# Patient Record
Sex: FEMALE | Race: WHITE | NOT HISPANIC OR LATINO | Employment: OTHER | URBAN - METROPOLITAN AREA
[De-identification: names, ages, dates, MRNs, and addresses within clinical notes are randomized per-mention and may not be internally consistent; named-entity substitution may affect disease eponyms.]

---

## 2018-01-18 NOTE — PROGRESS NOTES
Assessment    1  Aftercare following surgery of the musculoskeletal system (V58 78) (Z47 89)    Plan  Aftercare following surgery of the musculoskeletal system    · Follow-up visit in 6 months Evaluation and Treatment  Follow-up  Status: Complete   Done: [id number]  Aftercare following surgery of the musculoskeletal system, Closed fracture of neck of left  femur, sequela    · * XR HIP 2+ VIEW LEFT; Status:Resulted - Requires Verification;   Done: [id number]  02:35PM    Discussion/Summary    Patient was seen and examined by Dr Marco Guzman and myself  Patient is s/p left hip hemiarthroplasty  She is doing well  She is to continue WBAT to LLE and PT  Posterior hip precautions were stressed  Follow-up in 6 months with a new x-ray  Chief Complaint  post op left hip      Post-Op  HPI: This is a 80year old female who is s/p left hip hemiarthroplasty on December 5, 2015 by Dr Bishop Nguyen  She is improving with physical therapy  She is ambulating for longer distances with a walker  She states she has minimal pain  Review of Systems    Constitutional: No fever, no chills, feels well, no tiredness, no recent weight gain or loss  Eyes: No complaints of eyesight problems, no red eyes  ENT: no loss of hearing, no nosebleeds, no sore throat  Cardiovascular: No complaints of chest pain, no palpitations, no leg claudication or lower extremity edema  Respiratory: no compliants of shortness of breath, no wheezing, no cough  Gastrointestinal: no complaints of abdominal pain, no constipation, no nausea or diarrhea, no vomiting, no bloody stools  Genitourinary: no complaints of dysuria, no incontinence  Musculoskeletal: as noted in HPI  Integumentary: no complaints of skin rash or lesion, no itching or dry skin, no skin wounds  Neurological: no complaints of headache, no confusion, no numbness or tingling, no dizziness     Endocrine: No complaints of muscle weakness, no feelings of weakness, no frequent urination, no excessive thirst    Psychiatric: No suicidal thoughts, no anxiety, no feelings of depression  ROS reviewed  Active Problems    1  Aftercare following surgery of the musculoskeletal system (V58 78) (Z47 89)   2  Closed fracture of neck of left femur, initial encounter (820 8) (S72 002A)   3  Closed fracture of neck of left femur, sequela (820 8) (S72 002S)    Social History    · Never a smoker    Current Meds   1  Aspirin 325 MG Oral Tablet; TAKE 1 TABLET TWICE DAILY; Therapy: (Recorded:[id number]) to Recorded   2  OxyCODONE HCl - 5 MG Oral Tablet; TAKE 1 TABLET EVERY 4 HOURS AS NEEDED   FOR PAIN;   Therapy: (Recorded:[id number]) to Recorded    Allergies    1  No Known Drug Allergies    Vitals   Recorded: [id number] 03:00PM   Heart Rate 70   Systolic 037   Diastolic 76   Weight 797 lb      Physical Exam    Constitutional - General appearance: Normal    Musculoskeletal - Gait and station: Abnormal  Gait evaluation demonstrated Patient is in a stretcher today  Eyes   Conjunctiva and lids: Normal     Left Hip: Appearance: Incision healed  Surgical incision was clean, dry, and intact  Tenderness: None  Appropriate tenderness over incision  calf soft, nontender  ROM: limited ROM in all planes Flexion: painless  Internal rotation: which was painless  External rotation: which was painless  Motor: diffuse weakness  Deferred      Results/Data  * XR HIP 2+ VIEW LEFT [id number] 02:35PM Viraj CUENCA Order Number: [id number]   Performing Comments: DOS 12/5/15     Test Name Result Flag Reference   * XR HIP/PELV 2-3 VWS LEFT (Report)     LEFT HIP     INDICATION: Postoperative  Follow-up  COMPARISON: December 18, 2015     VIEWS: AP pelvis and 2 coned down views; 3 images     FINDINGS:     There is no fracture or dislocation  Left hip prosthesis in satisfactory position  Mild degenerative changes right hip  No lytic or blastic lesions are seen  Soft tissues are unremarkable         IMPRESSION:   Stable postoperative changes left hip  No acute osseous abnormality  Signed by:   Anshu Hendrix DO   1/29/16     I personally reviewed the films/images/results in the office today  My interpretation follows  X-ray Review 3 views of the left hip reveal an intact prosthesis with no evidence of dislocation or loosening        Signatures   Electronically signed by : Cynthia Duffy, [facility]; Jan 29 2016  3:52PM EST                       (Author)    Electronically signed by : Anneliese Mtz MD; Jan 29 2016  4:27PM EST                       (Author)

## 2018-07-17 ENCOUNTER — APPOINTMENT (INPATIENT)
Dept: RADIOLOGY | Facility: HOSPITAL | Age: 83
DRG: 041 | End: 2018-07-17
Payer: MEDICARE

## 2018-07-17 ENCOUNTER — APPOINTMENT (EMERGENCY)
Dept: RADIOLOGY | Facility: HOSPITAL | Age: 83
DRG: 041 | End: 2018-07-17
Payer: MEDICARE

## 2018-07-17 ENCOUNTER — HOSPITAL ENCOUNTER (INPATIENT)
Facility: HOSPITAL | Age: 83
LOS: 5 days | Discharge: NURSING, REHAB OR LONG-TERM CARE | DRG: 041 | End: 2018-07-22
Attending: EMERGENCY MEDICINE | Admitting: INTERNAL MEDICINE
Payer: MEDICARE

## 2018-07-17 DIAGNOSIS — Z92.82 RECEIVED INTRAVENOUS TISSUE PLASMINOGEN ACTIVATOR (T-PA) IN EMERGENCY DEPARTMENT: ICD-10-CM

## 2018-07-17 DIAGNOSIS — S82.832A OTHER CLOSED FRACTURE OF DISTAL END OF LEFT FIBULA, INITIAL ENCOUNTER: ICD-10-CM

## 2018-07-17 DIAGNOSIS — R27.0 ATAXIA OF LEFT UPPER EXTREMITY: ICD-10-CM

## 2018-07-17 DIAGNOSIS — R29.898 LEFT ARM WEAKNESS: Primary | ICD-10-CM

## 2018-07-17 DIAGNOSIS — I63.9 ACUTE CVA (CEREBROVASCULAR ACCIDENT) (HCC): ICD-10-CM

## 2018-07-17 DIAGNOSIS — B36.9 FUNGAL RASH OF TORSO: ICD-10-CM
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
## 2018-07-17 PROBLEM — R58 ECCHYMOSIS: Status: ACTIVE | Noted: 2018-07-17

## 2018-07-17 LAB
ABO GROUP BLD: NORMAL
ALBUMIN SERPL BCP-MCNC: 3.2 G/DL (ref 3.5–5)
ALP SERPL-CCNC: 68 U/L (ref 46–116)
ALT SERPL W P-5'-P-CCNC: 22 U/L (ref 12–78)
ANION GAP SERPL CALCULATED.3IONS-SCNC: 5 MMOL/L (ref 4–13)
APTT PPP: 32 SECONDS (ref 24–36)
AST SERPL W P-5'-P-CCNC: 27 U/L (ref 5–45)
BILIRUB DIRECT SERPL-MCNC: 0.07 MG/DL (ref 0–0.2)
BILIRUB SERPL-MCNC: 0.45 MG/DL (ref 0.2–1)
BLD GP AB SCN SERPL QL: NEGATIVE
BUN BLD-MCNC: 24 MG/DL (ref 5–25)
BUN SERPL-MCNC: 21 MG/DL (ref 5–25)
CA-I BLD-SCNC: 1.23 MMOL/L (ref 1.12–1.32)
CALCIUM SERPL-MCNC: 8.9 MG/DL (ref 8.3–10.1)
CHLORIDE BLD-SCNC: 105 MMOL/L (ref 100–108)
CHLORIDE SERPL-SCNC: 109 MMOL/L (ref 100–108)
CO2 SERPL-SCNC: 30 MMOL/L (ref 21–32)
CREAT BLD-MCNC: 0.7 MG/DL (ref 0.6–1.3)
CREAT SERPL-MCNC: 0.75 MG/DL (ref 0.6–1.3)
ERYTHROCYTE [DISTWIDTH] IN BLOOD BY AUTOMATED COUNT: 11.9 % (ref 11.6–15.1)
GFR SERPL CREATININE-BSD FRML MDRD: 73 ML/MIN/1.73SQ M
GFR SERPL CREATININE-BSD FRML MDRD: 80 ML/MIN/1.73SQ M
GLUCOSE SERPL-MCNC: 149 MG/DL (ref 65–140)
GLUCOSE SERPL-MCNC: 152 MG/DL (ref 65–140)
HCT VFR BLD AUTO: 44.2 % (ref 34.8–46.1)
HCT VFR BLD CALC: 42 % (ref 34.8–46.1)
HGB BLD-MCNC: 14.1 G/DL (ref 11.5–15.4)
HGB BLDA-MCNC: 14.3 G/DL (ref 11.5–15.4)
INR PPP: 1 (ref 0.86–1.17)
MCH RBC QN AUTO: 30.5 PG (ref 26.8–34.3)
MCHC RBC AUTO-ENTMCNC: 31.9 G/DL (ref 31.4–37.4)
MCV RBC AUTO: 96 FL (ref 82–98)
PLATELET # BLD AUTO: 210 THOUSANDS/UL (ref 149–390)
PMV BLD AUTO: 9.2 FL (ref 8.9–12.7)
POTASSIUM BLD-SCNC: 3.8 MMOL/L (ref 3.5–5.3)
POTASSIUM SERPL-SCNC: 4.1 MMOL/L (ref 3.5–5.3)
PROT SERPL-MCNC: 6.9 G/DL (ref 6.4–8.2)
PROTHROMBIN TIME: 13.3 SECONDS (ref 11.8–14.2)
RBC # BLD AUTO: 4.63 MILLION/UL (ref 3.81–5.12)
RH BLD: POSITIVE
SODIUM BLD-SCNC: 145 MMOL/L (ref 136–145)
SODIUM SERPL-SCNC: 144 MMOL/L (ref 136–145)
SPECIMEN EXPIRATION DATE: NORMAL
SPECIMEN SOURCE: ABNORMAL
TROPONIN I SERPL-MCNC: 0.02 NG/ML
TROPONIN I SERPL-MCNC: <0.02 NG/ML
WBC # BLD AUTO: 5.86 THOUSAND/UL (ref 4.31–10.16)

## 2018-07-17 PROCEDURE — 85610 PROTHROMBIN TIME: CPT | Performed by: EMERGENCY MEDICINE

## 2018-07-17 PROCEDURE — 93005 ELECTROCARDIOGRAM TRACING: CPT

## 2018-07-17 PROCEDURE — 70496 CT ANGIOGRAPHY HEAD: CPT

## 2018-07-17 PROCEDURE — 86850 RBC ANTIBODY SCREEN: CPT | Performed by: EMERGENCY MEDICINE

## 2018-07-17 PROCEDURE — 36415 COLL VENOUS BLD VENIPUNCTURE: CPT

## 2018-07-17 PROCEDURE — 85014 HEMATOCRIT: CPT

## 2018-07-17 PROCEDURE — 80048 BASIC METABOLIC PNL TOTAL CA: CPT | Performed by: EMERGENCY MEDICINE

## 2018-07-17 PROCEDURE — 85730 THROMBOPLASTIN TIME PARTIAL: CPT | Performed by: EMERGENCY MEDICINE

## 2018-07-17 PROCEDURE — 80047 BASIC METABLC PNL IONIZED CA: CPT

## 2018-07-17 PROCEDURE — 84484 ASSAY OF TROPONIN QUANT: CPT | Performed by: EMERGENCY MEDICINE

## 2018-07-17 PROCEDURE — 3E03317 INTRODUCTION OF OTHER THROMBOLYTIC INTO PERIPHERAL VEIN, PERCUTANEOUS APPROACH: ICD-10-PCS | Performed by: EMERGENCY MEDICINE

## 2018-07-17 PROCEDURE — 73610 X-RAY EXAM OF ANKLE: CPT

## 2018-07-17 PROCEDURE — 85027 COMPLETE CBC AUTOMATED: CPT | Performed by: EMERGENCY MEDICINE

## 2018-07-17 PROCEDURE — 99291 CRITICAL CARE FIRST HOUR: CPT | Performed by: EMERGENCY MEDICINE

## 2018-07-17 PROCEDURE — 96374 THER/PROPH/DIAG INJ IV PUSH: CPT

## 2018-07-17 PROCEDURE — 73110 X-RAY EXAM OF WRIST: CPT

## 2018-07-17 PROCEDURE — 71045 X-RAY EXAM CHEST 1 VIEW: CPT

## 2018-07-17 PROCEDURE — 80076 HEPATIC FUNCTION PANEL: CPT | Performed by: STUDENT IN AN ORGANIZED HEALTH CARE EDUCATION/TRAINING PROGRAM

## 2018-07-17 PROCEDURE — 84484 ASSAY OF TROPONIN QUANT: CPT | Performed by: STUDENT IN AN ORGANIZED HEALTH CARE EDUCATION/TRAINING PROGRAM

## 2018-07-17 PROCEDURE — 99285 EMERGENCY DEPT VISIT HI MDM: CPT

## 2018-07-17 PROCEDURE — 73140 X-RAY EXAM OF FINGER(S): CPT

## 2018-07-17 PROCEDURE — 70450 CT HEAD/BRAIN W/O DYE: CPT

## 2018-07-17 PROCEDURE — 86901 BLOOD TYPING SEROLOGIC RH(D): CPT | Performed by: EMERGENCY MEDICINE

## 2018-07-17 PROCEDURE — 73590 X-RAY EXAM OF LOWER LEG: CPT

## 2018-07-17 PROCEDURE — 99223 1ST HOSP IP/OBS HIGH 75: CPT | Performed by: PSYCHIATRY & NEUROLOGY

## 2018-07-17 PROCEDURE — 70498 CT ANGIOGRAPHY NECK: CPT

## 2018-07-17 PROCEDURE — 86900 BLOOD TYPING SEROLOGIC ABO: CPT | Performed by: EMERGENCY MEDICINE

## 2018-07-17 RX ORDER — ACETAMINOPHEN 500 MG
1000 TABLET ORAL EVERY 6 HOURS PRN
COMMUNITY

## 2018-07-17 RX ORDER — LABETALOL HYDROCHLORIDE 5 MG/ML
10 INJECTION, SOLUTION INTRAVENOUS ONCE
Status: COMPLETED | OUTPATIENT
Start: 2018-07-17 | End: 2018-07-17

## 2018-07-17 RX ORDER — ATORVASTATIN CALCIUM 40 MG/1
40 TABLET, FILM COATED ORAL EVERY EVENING
Status: DISCONTINUED | OUTPATIENT
Start: 2018-07-17 | End: 2018-07-22 | Stop reason: HOSPADM

## 2018-07-17 RX ORDER — SODIUM CHLORIDE 9 MG/ML
75 INJECTION, SOLUTION INTRAVENOUS CONTINUOUS
Status: DISCONTINUED | OUTPATIENT
Start: 2018-07-17 | End: 2018-07-18

## 2018-07-17 RX ADMIN — IODIXANOL 100 ML: 320 INJECTION, SOLUTION INTRAVASCULAR at 16:45

## 2018-07-17 RX ADMIN — ALTEPLASE 41.7 MG: KIT at 17:20

## 2018-07-17 RX ADMIN — LABETALOL HYDROCHLORIDE 10 MG: 5 INJECTION, SOLUTION INTRAVENOUS at 17:32

## 2018-07-17 RX ADMIN — ATORVASTATIN CALCIUM 40 MG: 40 TABLET, FILM COATED ORAL at 22:21

## 2018-07-17 RX ADMIN — SODIUM CHLORIDE 75 ML/HR: 0.9 INJECTION, SOLUTION INTRAVENOUS at 20:28

## 2018-07-17 NOTE — H&P
History and Physical - Critical Care  Mello Martinez 80 y o  female MRN: [id number]  Unit/Bed#: ED 29 Encounter: [id number]     Reason for Admission / Chief Complaint:  Pre-hospital stroke alert given tPA in the emergency room     History of Present Illness: Mello Martinez is a 80 y o  female with no past medical history but does not regularly see a doctor who presents to [facility] Emergency room this evening after multiple falls at home  Patient initially fell at home and had EMS come to her house for lift assist   At that time EMS noted no abnormalities, she did not hit her head, and she is not on any blood thinning medications  EMS left the patient at her residence at approximately , which was her last known normal   Approximately 20 minutes later the patient's son showed up and noted that she again was on the floor but this time had left-sided weakness with slurred speech and altered mental status  911 was called and the patient was brought to the emergency department as a pre-hospital stroke alert  Initial CT and CTA of the patient's head and neck were unremarkable  She had an NIHS score of five and was given tPA at approximately 1720  The points she received on the NIHS score were mainly for her left upper extremity ataxia rather than weakness  Patient did have some pain in her left lower extremity, which an x-ray showed a fractured left fibula  The patient had a splint placed in the emergency department  Patient currently not complaining of any pain elsewhere and she denies any headaches, change of vision, numbness, tingling, or weakness  History obtained from chart review and the patient  Past Medical History:  No past medical history on file  Past Surgical History:  No past surgical history on file  Past Family History:  No family history on file       Social History:  History   Smoking Status    Never Smoker   Smokeless Tobacco    Not on file     History   Alcohol Use No     History   Drug Use No     Marital Status: Single     Medications:  Current Facility-Administered Medications   Medication Dose Route Frequency    atorvastatin (LIPITOR) tablet 40 mg  40 mg Oral QPM    sodium chloride 0 9 % infusion  75 mL/hr Intravenous Continuous     Home medications:  Prior to Admission medications    Not on File     Allergies:  No Known Allergies     ROS:   Review of Systems   Constitutional: Negative for chills, fatigue and fever  HENT: Negative for congestion, rhinorrhea, sinus pressure and sore throat  Eyes: Negative for visual disturbance  Respiratory: Negative for cough and shortness of breath  Cardiovascular: Negative for chest pain  Gastrointestinal: Negative for abdominal pain, constipation, diarrhea, nausea and vomiting  Genitourinary: Negative for dysuria, frequency, hematuria and urgency  Musculoskeletal: Negative for arthralgias and myalgias  Skin: Negative for color change and rash  Neurological: Negative for dizziness, light-headedness and numbness  Vitals:  Vitals:    18 1835 18 1850 18 1905 18 1935   BP: (!) 177/82 169/81 154/75 151/75   BP Location:    Left arm   Pulse: 62 66 68 70   Resp:  18   Temp:       TempSrc:       SpO2:       Weight:         Temperature:   Temp (24hrs), Av 6 °F (37 °C), Min:97 8 °F (36 6 °C), Max:99 3 °F (37 4 °C)    Current: Temperature: 97 8 °F (36 6 °C)     Weights:   IBW: -92 5 kg  There is no height or weight on file to calculate BMI  Hemodynamic Monitoring:  N/A     Non-Invasive/Invasive Ventilation Settings:  Respiratory    Lab Data (Last 4 hours)    None         O2/Vent Data (Last 4 hours)    None              No results found for: PHART, YSG9VWZ, PO2ART, CSR6TPZ, A0RUJCIS, BEART, SOURCE  SpO2: SpO2: 97 %     Physical Exam:  Physical Exam   Constitutional: She is oriented to person, place, and time  She appears well-developed and well-nourished  No distress     HENT: Head: Normocephalic and atraumatic  Right Ear: External ear normal    Left Ear: External ear normal    Nose: Nose normal    Eyes: Conjunctivae and EOM are normal  Pupils are equal, round, and reactive to light  Right eye exhibits no discharge  Left eye exhibits no discharge  No scleral icterus  Neck: Normal range of motion  Neck supple  No JVD present  No tracheal deviation present  No thyromegaly present  Cardiovascular: Normal rate, regular rhythm, normal heart sounds and intact distal pulses  Exam reveals no gallop and no friction rub  No murmur heard  Pulmonary/Chest: Effort normal  No stridor  No respiratory distress  She has no wheezes  She has no rales  Abdominal: Soft  Bowel sounds are normal  She exhibits no distension  There is no tenderness  There is no rebound  Musculoskeletal: She exhibits edema (Swelling of the left wrist and left lower extremity surrounding the fracture)  She exhibits no tenderness or deformity  Lymphadenopathy:     She has no cervical adenopathy  Neurological: She is alert and oriented to person, place, and time  No cranial nerve deficit  She exhibits normal muscle tone  Coordination normal    Skin: Skin is warm and dry  No rash noted  She is not diaphoretic  No erythema  Multiple ecchymoses covering patient's bilateral upper extremities  See pictures below  Nursing note and vitals reviewed                           Labs:    Results from last 7 days  Lab Units 07/17/18  1644 07/17/18  1641   WBC Thousand/uL 5 86  --    HEMOGLOBIN g/dL 14 1  --    I STAT HEMOGLOBIN g/dl  --  14 3   HEMATOCRIT % 44 2  --    PLATELETS Thousands/uL 210  --       Results from last 7 days  Lab Units 07/17/18  1644 07/17/18  1641   SODIUM mmol/L 144  --    POTASSIUM mmol/L 4 1  --    CHLORIDE mmol/L 109*  --    CO2 mmol/L 30  --    BUN mg/dL 21  --    CREATININE mg/dL 0 75  --    CALCIUM mg/dL 8 9  --    GLUCOSE RANDOM mg/dL 149*  --    GLUCOSE, ISTAT mg/dl  --  152* Results from last 7 days  Lab Units 07/17/18  1644   INR  1 00   PTT seconds 32           0  Lab Value Date/Time   TROPONINI <0 02 07/17/2018 1644        Imaging: CTH - No acute intracranial abnormality  Mild chronic small vessel ischemic changes and volume loss  CTA - No acute intracranial abnormality      No focal stenosis or saccular aneurysm within the [place]      No hemodynamically significant stenosis within either common or internal carotid artery  Less than 50% stenosis by NASCET criteria      Unremarkable bilateral vertebral arteries  Tib/fib xray- Nondisplaced fracture of the distal fibula     I have personally reviewed pertinent films in PACS    Micro:  No results found for: Irene Storey, SPUTUM[employer]UR    Assessment: 81 yo female with acute onset LUE ataxia given tPA for suspected stroke currently doing well  Plan:                  Neuro: suspected stroke - Patient currently without any neurologic abnormalities on exam  Will admit her under the stroke pathway and get a repeat CTH 24h post tPA to assess for any bleed, which will occur at approximately 1700 tomorrow  Will also evaluate patient via MRI to further clarify whether patient suffered a CVA or not  CV: Patient with no medical history but will allow for permissive HTN up to 160 SBP  Follow up lipid panel  Lung: No active issues at this time  Patient currently satting well on room air  GI: No active issues at this time  FEN: Maintenance IVF at 75ml/hr NS currently  Monitor electrolytes daily with goal K>4, P>3, and Mg>2  Replete as necessary  Patient currently npo  : No active issues at this time  Follow up urinalysis  ID: No active issues at this time  Heme: No active issues  Endo: No active issues  f/u hemoglobin A1c                   Msk/Skin: Patient with splint in place for her distal fibula fracture  Continue to monitor for signs and symptoms of compartment syndrome especially in the setting of tPA administration  Patient with multiple ecchymoses covering her upper extremities  Borders of the ecchymoses marked and pictures taken (see physical exam)  Disposition: Continue ICU care     VTE Pharmacologic Prophylaxis: Reason for no pharmacologic prophylaxis tPA administration  VTE Mechanical Prophylaxis: sequential compression device     Invasive lines and devices: Invasive Devices     Peripheral Intravenous Line            Peripheral IV 07/17/18 Left Antecubital less than 1 day    Peripheral IV 07/17/18 Right Antecubital less than 1 day                 Code Status: Level 1 - Full Code  POA:    POLST:       Given critical illness, patient length of stay will require greater than two midnights  Counseling / Coordination of Care  Total Critical Care time spent 30 minutes excluding procedures, teaching and family updates  Portions of the record may have been created with voice recognition software  Occasional wrong word or "sound a like" substitutions may have occurred due to the inherent limitations of voice recognition software  Read the chart carefully and recognize, using context, where substitutions have occurred          Deborah Caballero MD

## 2018-07-17 NOTE — ED PROVIDER NOTES
History  Chief Complaint   Patient presents with    STROKE Alert     Pt fell twice today  After second time falling pt was seen altered and leaning on her L side  Patient is an 80year-old female with a history of L hip replacements, not on any current medications, who presents as a pre-hospital stroke alert with left-sided weakness  Per EMS report, they were called to the home at 3:30 for a lift assist after the patient fell  She did not have any neurologic deficits per their exam at that time  Less than 20 minutes later, the son arrived to her home found her on the ground  She had left-sided weakness at that time  EMS also noted some mild slurred speech and waxing-and-waning confusion  Pre-hospital glucose was normal  On my initial evaluation, patient reports left-sided weakness and left ankle pain since the fall  She denies headache, blurry/double vision, facial droop, chest pain, shortness of breath, abdominal pain, nausea/vomiting, diarrhea, fever/chills  Prior to Admission Medications   Prescriptions Last Dose Informant Patient Reported? Taking?   acetaminophen (TYLENOL) 500 mg tablet  Self Yes Yes   Sig: Take 1,000 mg by mouth every 6 (six) hours as needed for mild pain      Facility-Administered Medications: None       Past Medical History:   Diagnosis Date    Arthritis     scoliosis       Past Surgical History:   Procedure Laterality Date    ABDOMINAL SURGERY      bowel surgery approx  2003    JOINT REPLACEMENT Left 2015    hip       History reviewed  No pertinent family history  I have reviewed and agree with the history as documented  Social History   Substance Use Topics    Smoking status: Never Smoker    Smokeless tobacco: Never Used    Alcohol use No        Review of Systems   Constitutional: Negative for chills, fatigue and fever  HENT: Negative for congestion and sore throat  Eyes: Negative for visual disturbance     Respiratory: Negative for cough and shortness of breath  Cardiovascular: Negative for chest pain  Gastrointestinal: Negative for abdominal pain, diarrhea, nausea and vomiting  Endocrine: Negative for polyuria  Genitourinary: Negative for difficulty urinating and dysuria  Musculoskeletal: Positive for joint swelling  Skin: Negative for rash and wound  Neurological: Positive for weakness  Negative for dizziness, light-headedness and headaches  Psychiatric/Behavioral: Negative for confusion  All other systems reviewed and are negative  Physical Exam  ED Triage Vitals   Temperature Pulse Respirations Blood Pressure SpO2   07/17/18 1637 07/17/18 1636 07/17/18 1636 07/17/18 1636 07/17/18 1636   99 3 °F (37 4 °C) (!) 107 18 107/73 97 %      Temp Source Heart Rate Source Patient Position - Orthostatic VS BP Location FiO2 (%)   07/17/18 1637 07/17/18 1826 07/17/18 1826 07/17/18 1826 --   Tympanic Monitor Lying Left arm       Pain Score       07/17/18 2000       3           Orthostatic Vital Signs  Vitals:    07/17/18 2100 07/17/18 2150 07/17/18 2200 07/17/18 2300   BP: 129/65  141/72 140/69   Pulse: 72  68 70   Patient Position - Orthostatic VS:  Lying Lying Lying       Physical Exam   Constitutional: She is oriented to person, place, and time  She appears well-developed and well-nourished  No distress  HENT:   Head: Normocephalic and atraumatic  Eyes: EOM are normal  Pupils are equal, round, and reactive to light  Neck: Normal range of motion  Neck supple  Cardiovascular: Normal rate, regular rhythm and normal heart sounds  Pulmonary/Chest: Effort normal and breath sounds normal  No respiratory distress  Abdominal: Soft  Bowel sounds are normal  There is no tenderness  Musculoskeletal: Normal range of motion  Neurological: She is alert and oriented to person, place, and time  Dysarthria speech  CN otherwise intact  Severe ataxia left side, upper greater than lower  5 out of 5 strength bilateral upper and lower extremities  Skin: Skin is warm and dry  Psychiatric: She has a normal mood and affect  Nursing note and vitals reviewed        ED Medications  Medications   sodium chloride 0 9 % infusion (75 mL/hr Intravenous New Bag 7/17/18 2028)   atorvastatin (LIPITOR) tablet 40 mg (40 mg Oral Given 7/17/18 2221)   iodixanol (VISIPAQUE) 320 MG/ML injection 100 mL (100 mL Intravenous Given 7/17/18 1645)   alteplase (ACTIVASE) bolus 4 6 mg (4 6 mg Intravenous Given 7/17/18 1719)   alteplase (ACTIVASE) infusion 41 7 mg (41 7 mg Intravenous Given 7/17/18 1720)   labetalol (NORMODYNE) injection 10 mg (10 mg Intravenous Given 7/17/18 1732)       Diagnostic Studies  Results Reviewed     Procedure Component Value Units Date/Time    Troponin I [[id number]]  (Normal) Collected:  07/17/18 2247    Lab Status:  Final result Specimen:  Blood from Arm, Left Updated:  07/17/18 2315     Troponin I 0 02 ng/mL     Hepatic function panel [[id number]]  (Abnormal) Collected:  07/17/18 1644    Lab Status:  Final result Specimen:  Blood from Arm, Right Updated:  07/17/18 2017     Total Bilirubin 0 45 mg/dL      Bilirubin, Direct 0 07 mg/dL      Alkaline Phosphatase 68 U/L      AST 27 U/L      ALT 22 U/L      Total Protein 6 9 g/dL      Albumin 3 2 (L) g/dL     Urinalysis with reflex to microscopic [[id number]]     Lab Status:  No result Specimen:  Urine     APTT [[id number]]  (Normal) Collected:  07/17/18 1644    Lab Status:  Final result Specimen:  Blood from Arm, Right Updated:  07/17/18 1731     PTT 32 seconds     Protime-INR [[id number]]  (Normal) Collected:  07/17/18 1644    Lab Status:  Final result Specimen:  Blood from Arm, Right Updated:  07/17/18 1731     Protime 13 3 seconds      INR 1 00    Troponin I [[id number]]  (Normal) Collected:  07/17/18 1644    Lab Status:  Final result Specimen:  Blood from Arm, Right Updated:  07/17/18 1729     Troponin I <0 02 ng/mL     Basic metabolic panel [[id number]]  (Abnormal) Collected:  07/17/18 1644    Lab Status:  Final result Specimen:  Blood from Arm, Right Updated:  07/17/18 1725     Sodium 144 mmol/L      Potassium 4 1 mmol/L      Chloride 109 (H) mmol/L      CO2 30 mmol/L      Anion Gap 5 mmol/L      BUN 21 mg/dL      Creatinine 0 75 mg/dL      Glucose 149 (H) mg/dL      Calcium 8 9 mg/dL      eGFR 73 ml/min/1 73sq m     Narrative:         National Kidney Disease Education Program recommendations are as follows:  GFR calculation is accurate only with a steady state creatinine  Chronic Kidney disease less than 60 ml/min/1 73 sq  meters  Kidney failure less than 15 ml/min/1 73 sq  meters  CBC [[id number]]  (Normal) Collected:  07/17/18 1644    Lab Status:  Final result Specimen:  Blood from Arm, Right Updated:  07/17/18 1702     WBC 5 86 Thousand/uL      RBC 4 63 Million/uL      Hemoglobin 14 1 g/dL      Hematocrit 44 2 %      MCV 96 fL      MCH 30 5 pg      MCHC 31 9 g/dL      RDW 11 9 %      Platelets 546 Thousands/uL      MPV 9 2 fL     POCT Chem 8+ [[id number]]  (Abnormal) Collected:  07/17/18 1641    Lab Status:  Final result Updated:  07/17/18 1645     SODIUM, I-STAT 145 mmol/l      Potassium, i-STAT 3 8 mmol/L      Chloride, istat 105 mmol/L      CO2, i-STAT -- mmol/L      Anion Gap, Istat -- mmol/L      Calcium, Ionized i-STAT 1 23 mmol/L      BUN, I-STAT 24 mg/dl      Creatinine, i-STAT 0 7 mg/dl      eGFR 80 ml/min/1 73sq m      Glucose, i-STAT 152 (H) mg/dl      Hct, i-STAT 42 %      Hgb, i-STAT 14 3 g/dl      Specimen Type VENOUS                 XR wrist 3+ views LEFT   Final Result by Cuong Booth MD (07/17 2054)      No acute osseous abnormality  Workstation performed: [id number]         XR finger second digit-index RIGHT   Final Result by Cuong Booth MD (07/17 2050)      No fracture  Workstation performed: [id number]         XR ankle 3+ views LEFT   Final Result by Cuong Booth MD (07/17 1756)      Nondisplaced fracture of the distal fibula        The study was marked in EPIC for significant notification  Workstation performed: [id number]         XR stroke alert portable chest   Final Result by Christie Arguello MD (07/17 1714)      No acute cardiopulmonary disease  Workstation performed: [id number]         CTA stroke alert (head/neck)   Final Result by Noble Singh MD (07/17 1708)      No acute intracranial abnormality  No focal stenosis or saccular aneurysm within the [place]  No hemodynamically significant stenosis within either common or internal carotid artery  Less than 50% stenosis by NASCET criteria  Unremarkable bilateral vertebral arteries  Workstation performed: [id number]         CT stroke alert brain   Final Result by Noble Singh MD (07/17 1654)      No acute intracranial abnormality  Mild chronic small vessel ischemic changes and volume loss  Findings were directly discussed with Merary Pride on 7/17/2018 4:45 PM       Workstation performed: [id number]         MRI Inpatient Order    (Results Pending)   XR tibia fibula 2 vw left    (Results Pending)         Procedures  Procedures      Phone Consults  ED Phone Contact    ED Course                               MDM  Number of Diagnoses or Management Options  Ataxia of left upper extremity:   Left arm weakness:   Received intravenous tissue plasminogen activator (t-PA) in emergency department:   Diagnosis management comments: Patient is an 80year-old female with a history of L hip replacements, not on any current medications, who presents as a pre-hospital stroke alert  Found to have left upper>lower ataxia and slurred/dysarthric speech  Initial NIH stroke scale 5  Last known normal 3:30 per EMS, who evaluated her at home following a fall  tPA administered  X-ray left ankle shows nondisplaced fracture of left fibula  Will splint  Patient admitted to the ICU      CritCare Time    Disposition  Final diagnoses:   Left arm weakness Received intravenous tissue plasminogen activator (t-PA) in emergency department   Ataxia of left upper extremity     Time reflects when diagnosis was documented in both MDM as applicable and the Disposition within this note     Time User Action Codes Description Comment    7/17/2018  4:39 PM Kirsten Amber Add [R29 [id number]] Left arm weakness     7/17/2018  7:21 PM Fahad Stover [Z92 [id number]] Received intravenous tissue plasminogen activator (t-PA) in emergency department     7/17/2018  7:21 PM Ilia Huber Add [R27 0] Ataxia of left upper extremity       ED Disposition     ED Disposition Condition Comment    Admit  Case was discussed with Critical care and the patient's admission status was agreed to be Admission Status: inpatient status to the service of Dr Lyn Wiggins   Follow-up Information    None         Current Discharge Medication List      CONTINUE these medications which have NOT CHANGED    Details   acetaminophen (TYLENOL) 500 mg tablet Take 1,000 mg by mouth every 6 (six) hours as needed for mild pain           No discharge procedures on file  ED Provider  Attending physically available and evaluated Euna Lombard I managed the patient along with the ED Attending      Electronically Signed by         Nancy Manning MD  07/17/18 3133

## 2018-07-17 NOTE — CONSULTS
Consultation - Stroke   Meg Contreras 80 y o  female MRN: [id number]  Unit/Bed#: ED 29 Encounter: [id number]      Assessment/Plan   Assessment: Meg Contreras is a 80 y o  female who is hard of hearing with no significant PMH, medications or allergies who presented to the ED with L sided weakness, ataxia and dysarthria with last known well at 1530  NIHSS 5   CT head and CTA were negative for acute abnormality or large vessel occlusion/flow limiting stenosis  TPA Decision: TPA given this admission  After a discussion of risks and benefits reviewing inclusion and exclusion criteria the decision was made to proceed with thrombolytic therapy  Verbal consent was obtained from  the patient  Plan:   L sided weakness, ataxia and dysarthria   Acute ischemic stroke protocol  tPA given  No CTA target lesion identified; therefore patient not a candidate for endovascular intervention  Hold AC/AP for now  Await repeat CTH x 24hrs post tPA as per protocol  Order Atorvastatin 40mg  CT head negative for acute abnormality  CTA of head and neck negative for large vessel occlusion  flow limiting stenosis  Order MRI brain without contrast   Order Echo   Telemetry  Order Lipid panel, HbA1C, TSH, UA, UDS   Secondary risk factor modification  Permissive hypertension with BP <180/105   Hyperglycemia control   PT/OT/ST  Stroke Education  Frequent neurological checks  Stat CT head with acute decline of in exam/mental status  Notify neurology with any changes in examination  History of Present Illness     Reason for Consult / Principal Problem: Stroke alert  Hx and PE limited by: dysarthria  Patient last known well: [address]  Stroke alert called: 1630  Neurology time of arrival: 1632  HPI: Meg Contreras is a 80 y o  female who is hard of hearing with no significant PMH, medications or allergies who presented to the ED with stroke like symptoms  According to EMS, the patient fell at approximately [address]   She required a police assist to stand up but apparently at that time she had no notable deficits  Approximately 20 minutes prior to arrival, the patient's son came to her home and found her on the floor again  EMS was called who noted L sided weakness, dysarthria and occasional alteration in MS  Per EMS vital signs were stable with /60 HR 72 NSR  CT head and CTA were negative for acute abnormality or large vessel occlusion or flow limiting stenosis  Inpatient consult to Neurology  Consult performed by: Miesha Renteria ordered by: Brian Bernard        Review of Systems Unable to evaluate due to urgency of situation, though patient does have L ankle pain  Historical Information   No past medical history on file  No past surgical history on file  Social History   History   Alcohol use Not on file     History   Drug use: Unknown     History   Smoking Status    Not on file   Smokeless Tobacco    Not on file     Family History: No family history on file  Review of previous medical records was completed  Meds/Allergies   all current active meds have been reviewed    Allergies not on file    Objective   Vitals:Blood pressure 107/73, pulse (!) 107, temperature 99 3 °F (37 4 °C), temperature source Tympanic, resp  rate 18, SpO2 97 %  ,There is no height or weight on file to calculate BMI  No intake or output data in the 24 hours ending 07/17/18 1644    Invasive Devices: Invasive Devices     Peripheral Intravenous Line            Peripheral IV 07/17/18 Left Antecubital less than 1 day    Peripheral IV 07/17/18 Right Antecubital less than 1 day                Physical Exam  Neurologic Exam    NIHSS:  1a Level of Consciousness: 0 = Alert   1b  LOC Questions: 0 = Answers both correctly   1c  LOC Commands: 0 = Obeys both correctly   2  Best Gaze: 0 = Normal   3  Visual: 0 = No visual field loss   4  Facial Palsy: 0=Normal symmetric movement   5a   Motor Right Arm: 0=No drift, limb holds 90 (or 45) degrees for full 10 seconds   5b  Motor Left Arm: 2=Some effort against gravity, limb cannot get to or maintain (if cured) 90 (or 45) degrees, drifts down to bed, but has some effort against gravity   6a  Motor Right Le=No drift, limb holds 90 (or 45) degrees for full 10 seconds   6b  Motor Left Le=No drift, limb holds 90 (or 45) degrees for full 10 seconds   7  Limb Ataxia:  2=Present in two limbs   8  Sensory: 0=Normal; no sensory loss   9  Best Language:  0=No aphasia, normal   10  Dysarthria: 1=Mild to moderate, patient slurs at least some words and at worst, can be understood with some difficulty   11  Extinction and Inattention (formerly Neglect): 0=No abnormality   Total Score: 5     Time NIHSS was completed: 5    Lab Results: No results found for this or any previous visit (from the past 24 hour(s))     Imaging Studies: I have personally reviewed pertinent films in PACS

## 2018-07-17 NOTE — PROGRESS NOTES
07/17/18 1700   Spiritual Beliefs/Perceptions   Support Systems Children   Plan of Care   Comments Pt  came in to hospital as stroke alert  Provided pastoral care to son     Assessment Completed by: Unit visit

## 2018-07-17 NOTE — ED ATTENDING ATTESTATION
Carmelina Daniel DO, saw and evaluated the patient  I have discussed the patient with the resident/non-physician practitioner and agree with the resident's/non-physician practitioner's findings, Plan of Care, and MDM as documented in the resident's/non-physician practitioner's note, except where noted  All available labs and Radiology studies were reviewed  At this point I agree with the current assessment done in the Emergency Department  I have conducted an independent evaluation of this patient a history and physical is as follows:    79 yo female BIBA as prehospital stroke alert for acute onset L sided weakness, confusion  Started at 1530h after she fell  20 minutes later she fell again and pts son noted L sided weakness, confusion and mild slurred speech  Symptoms continue to wax and wane  Evaluated pt in CT scan, had LUE ataxia, LLE ataxia, continued mild slurred speech  Pt follow commands  No other c/o at this time  Imp: CVA plan: stroke alert  Will determine if pt requires tPA  Will require admission for further eval and tx        Critical Care Time  CritCare Time    Procedures

## 2018-07-18 ENCOUNTER — APPOINTMENT (INPATIENT)
Dept: RADIOLOGY | Facility: HOSPITAL | Age: 83
DRG: 041 | End: 2018-07-18
Payer: MEDICARE

## 2018-07-18 ENCOUNTER — APPOINTMENT (INPATIENT)
Dept: NON INVASIVE DIAGNOSTICS | Facility: HOSPITAL | Age: 83
DRG: 041 | End: 2018-07-18
Payer: MEDICARE

## 2018-07-18 LAB
ATRIAL RATE: 66 BPM
BACTERIA UR QL AUTO: ABNORMAL /HPF
BILIRUB UR QL STRIP: NEGATIVE
CHOLEST SERPL-MCNC: 133 MG/DL (ref 50–200)
CLARITY UR: CLEAR
COLOR UR: YELLOW
EST. AVERAGE GLUCOSE BLD GHB EST-MCNC: 117 MG/DL
GLUCOSE UR STRIP-MCNC: NEGATIVE MG/DL
HBA1C MFR BLD: 5.7 % (ref 4.2–6.3)
HDLC SERPL-MCNC: 46 MG/DL (ref 40–60)
HGB UR QL STRIP.AUTO: NEGATIVE
HYALINE CASTS #/AREA URNS LPF: ABNORMAL /LPF
KETONES UR STRIP-MCNC: NEGATIVE MG/DL
LDLC SERPL CALC-MCNC: 75 MG/DL (ref 0–100)
LEUKOCYTE ESTERASE UR QL STRIP: NEGATIVE
NITRITE UR QL STRIP: NEGATIVE
NON-SQ EPI CELLS URNS QL MICRO: ABNORMAL /HPF
P AXIS: 46 DEGREES
PH UR STRIP.AUTO: 7 [PH] (ref 4.5–8)
PR INTERVAL: 192 MS
PROT UR STRIP-MCNC: ABNORMAL MG/DL
QRS AXIS: -8 DEGREES
QRSD INTERVAL: 94 MS
QT INTERVAL: 412 MS
QTC INTERVAL: 431 MS
RBC #/AREA URNS AUTO: ABNORMAL /HPF
SP GR UR STRIP.AUTO: >1.045 (ref 1–1.03)
T WAVE AXIS: 42 DEGREES
TRIGL SERPL-MCNC: 61 MG/DL
UROBILINOGEN UR QL STRIP.AUTO: 1 E.U./DL
VENTRICULAR RATE: 66 BPM
WBC #/AREA URNS AUTO: ABNORMAL /HPF

## 2018-07-18 PROCEDURE — 81001 URINALYSIS AUTO W/SCOPE: CPT | Performed by: STUDENT IN AN ORGANIZED HEALTH CARE EDUCATION/TRAINING PROGRAM

## 2018-07-18 PROCEDURE — 83036 HEMOGLOBIN GLYCOSYLATED A1C: CPT | Performed by: STUDENT IN AN ORGANIZED HEALTH CARE EDUCATION/TRAINING PROGRAM

## 2018-07-18 PROCEDURE — 94760 N-INVAS EAR/PLS OXIMETRY 1: CPT

## 2018-07-18 PROCEDURE — 73600 X-RAY EXAM OF ANKLE: CPT

## 2018-07-18 PROCEDURE — 93010 ELECTROCARDIOGRAM REPORT: CPT | Performed by: INTERNAL MEDICINE

## 2018-07-18 PROCEDURE — 80061 LIPID PANEL: CPT | Performed by: STUDENT IN AN ORGANIZED HEALTH CARE EDUCATION/TRAINING PROGRAM

## 2018-07-18 PROCEDURE — 70551 MRI BRAIN STEM W/O DYE: CPT

## 2018-07-18 PROCEDURE — 70450 CT HEAD/BRAIN W/O DYE: CPT

## 2018-07-18 PROCEDURE — 99233 SBSQ HOSP IP/OBS HIGH 50: CPT | Performed by: ANESTHESIOLOGY

## 2018-07-18 PROCEDURE — 92610 EVALUATE SWALLOWING FUNCTION: CPT

## 2018-07-18 PROCEDURE — 99232 SBSQ HOSP IP/OBS MODERATE 35: CPT | Performed by: PSYCHIATRY & NEUROLOGY

## 2018-07-18 PROCEDURE — 93306 TTE W/DOPPLER COMPLETE: CPT | Performed by: INTERNAL MEDICINE

## 2018-07-18 PROCEDURE — 93306 TTE W/DOPPLER COMPLETE: CPT

## 2018-07-18 RX ORDER — ASPIRIN 81 MG/1
81 TABLET ORAL DAILY
Status: DISCONTINUED | OUTPATIENT
Start: 2018-07-19 | End: 2018-07-22 | Stop reason: HOSPADM

## 2018-07-18 RX ORDER — NYSTATIN 100000 [USP'U]/G
POWDER TOPICAL 2 TIMES DAILY
Status: DISCONTINUED | OUTPATIENT
Start: 2018-07-18 | End: 2018-07-22 | Stop reason: HOSPADM

## 2018-07-18 RX ADMIN — SODIUM CHLORIDE 75 ML/HR: 0.9 INJECTION, SOLUTION INTRAVENOUS at 22:42

## 2018-07-18 RX ADMIN — NYSTATIN: 100000 POWDER TOPICAL at 17:01

## 2018-07-18 RX ADMIN — ATORVASTATIN CALCIUM 40 MG: 40 TABLET, FILM COATED ORAL at 19:33

## 2018-07-18 RX ADMIN — SODIUM CHLORIDE 75 ML/HR: 0.9 INJECTION, SOLUTION INTRAVENOUS at 08:48

## 2018-07-18 NOTE — ORTHOTIC NOTE
Orthotic Note            Date: 7/18/2018      Patient Name: Zoltan Abraham        Time: 16:00pm    Reason for Consult:  Patient Active Problem List   Diagnosis    [facility])    Fall    Ataxia    Closed fracture of distal end of left fibula    Ecchymosis   Small Breg J-Walker Boot  LLE   Per Orthopedics    I first removed splint on LLE and measured, fit and donned Small Breg J-Walker boot to patient's LLE while supine in bed  Patient tolerated well without complaint and instructions/adjustments reviewed at this time  I will continue to follow up daily  RN aware  My contact information and instructions at bedside  Recommendations:  Please call Mobility Coordinator at ext  [phone] in regards to bracing instruction and/or adjustment  Adams Sommers Mobility Coordinator LCFo, LCOF, ASOP R  O T, O B T

## 2018-07-18 NOTE — PROGRESS NOTES
Progress Note - Neurology   Raul Swensonr 80 y o  female MRN: [id number]  Unit/Bed#: ICU 14 Encounter: [id number]    Assessment:  80-year-old female who is hard of hearing with no significant pmhx, presented to the ED with left-sided weakness, ataxia and dysarthria last known well at 3:30 p m  NIH of 5  TPA given, CTA was negative for acute abnormality or large vessel occlusion/flow limiting stenosis  Nondisplaced fracture of the distal fibula on the left    Plan:  Acute ischemic stroke protocol status post TPA  tPA given  No CTA target lesion identified; therefore patient not a candidate for endovascular intervention  Hold AC/AP for now  Await repeat CTH x 24hrs post tPA as per protocol Atorvastatin 40mg  CT head negative for acute abnormality  CTA of head and neck negative for large vessel occlusion  flow limiting stenosis  Landon Tomas without contrast pending  Echo pending  Telemetry  Secondary risk factor modification  PT/OT/ST  Stroke Education  Frequent neurological checks  Stat CT head with acute decline of in exam/mental status  Notify neurology with any changes in examination  Continue supportive care and monitor for infectious / metabolic derangements  Management of fracture per ICU team    Subjective:   ICU follow up on stroke status post tpa  ecymosis of the left upper extremity noted  Cast noted of left lower extremity  She reports she is in the hospital secondary to stroke  She reports she has pain on the left lower extremity    ROS:  No ha, cp, sob, palp, nausea or vomiting reported  + left sided weakness, + pain left lower extremity    Vitals: Blood pressure 119/58, pulse 74, temperature 98 8 °F (37 1 °C), temperature source Oral, resp  rate 22, height 4' 11" (1 499 m), weight 48 4 kg (106 lb 11 2 oz), SpO2 91 %  ,Body mass index is 21 55 kg/m²         Current Facility-Administered Medications:  atorvastatin 40 mg Oral QPM Dudley Sanchez MD    sodium chloride 75 mL/hr Intravenous Continuous Fani Carlisle MD Last Rate: 75 mL/hr (07/18/18 0848)       Physical Exam:     Constitutional - in NAD, lying in bed  HEENT - NC/AT, no icterus noted, conjuctiva clear, oral mucosa free of exudate, poor dentition  Cardiac - No murmur noted, rate regular  Lungs - Clear to auscultation bilaterally, no wheezing noted  Abdomen - Soft and non tender, non distended  Extremities - + ecchymosis noted of the left upper extremity - + hematoma with ace wrap noted  Left lower in soft cast    Neurological    Mental status - the patient is hard of hearing, she was able to tell me her name, place, not year  She was unable to tell me president, she was able to read name tag, she was able to do simple calculations, unable to spell world backwards  There was dysarthria noted  Slow to respond at times  She had difficulty with reading NIH stroke scale, and recognizing objects - with hesitancy in speech, ? Va deficits    Cranial nerves 2 through 12 are intact - except slight decrease in left nasolabial fold, dysarthria    Motor - 5/5 right upper and lower extremity, left upper there was no drift noted, but appears weaker than the right 5-/5, left lower was limited from pain  No tremor noted    Sensation - nonfocal to crude touch uppers and lowers and face    Coordination -  no ataxia noted on finger-to-nose on right, slower on left ? Mild ataxia noted  Toes are downgoing left, right did not test    No evidence myoclonus or tremor      Lab, Imaging and other studies:    Recent Results (from the past 24 hour(s))   POCT Chem 8+    Collection Time: 07/17/18  4:41 PM   Result Value Ref Range    SODIUM, I-STAT 145 136 - 145 mmol/l    Potassium, i-STAT 3 8 3 5 - 5 3 mmol/L    Chloride, istat 105 100 - 108 mmol/L    CO2, i-STAT  21 - 32 mmol/L    Anion Gap, Istat  4 - 13 mmol/L    Calcium, Ionized i-STAT 1 23 1 12 - 1 32 mmol/L    BUN, I-STAT 24 5 - 25 mg/dl    Creatinine, i-STAT 0 7 0 6 - 1 3 mg/dl    eGFR 80 ml/min/1 73sq m    Glucose, i-STAT 152 (H) 65 - 140 mg/dl    Hct, i-STAT 42 34 8 - 46 1 %    Hgb, i-STAT 14 3 11 5 - 15 4 g/dl    Specimen Type VENOUS    APTT    Collection Time: 07/17/18  4:44 PM   Result Value Ref Range    PTT 32 24 - 36 seconds   Basic metabolic panel    Collection Time: 07/17/18  4:44 PM   Result Value Ref Range    Sodium 144 136 - 145 mmol/L    Potassium 4 1 3 5 - 5 3 mmol/L    Chloride 109 (H) 100 - 108 mmol/L    CO2 30 21 - 32 mmol/L    Anion Gap 5 4 - 13 mmol/L    BUN 21 5 - 25 mg/dL    Creatinine 0 75 0 60 - 1 30 mg/dL    Glucose 149 (H) 65 - 140 mg/dL    Calcium 8 9 8 3 - 10 1 mg/dL    eGFR 73 ml/min/1 73sq m   CBC    Collection Time: 07/17/18  4:44 PM   Result Value Ref Range    WBC 5 86 4 31 - 10 16 Thousand/uL    RBC 4 63 3 81 - 5 12 Million/uL    Hemoglobin 14 1 11 5 - 15 4 g/dL    Hematocrit 44 2 34 8 - 46 1 %    MCV 96 82 - 98 fL    MCH 30 5 26 8 - 34 3 pg    MCHC 31 9 31 4 - 37 4 g/dL    RDW 11 9 11 6 - 15 1 %    Platelets 239 873 - 255 Thousands/uL    MPV 9 2 8 9 - 12 7 fL   Protime-INR    Collection Time: 07/17/18  4:44 PM   Result Value Ref Range    Protime 13 3 11 8 - 14 2 seconds    INR 1 00 0 86 - 1 17   Troponin I    Collection Time: 07/17/18  4:44 PM   Result Value Ref Range    Troponin I <0 02 <=0 04 ng/mL   Hepatic function panel    Collection Time: 07/17/18  4:44 PM   Result Value Ref Range    Total Bilirubin 0 45 0 20 - 1 00 mg/dL    Bilirubin, Direct 0 07 0 00 - 0 20 mg/dL    Alkaline Phosphatase 68 46 - 116 U/L    AST 27 5 - 45 U/L    ALT 22 12 - 78 U/L    Total Protein 6 9 6 4 - 8 2 g/dL    Albumin 3 2 (L) 3 5 - 5 0 g/dL   Type and screen    Collection Time: 07/17/18  7:29 PM   Result Value Ref Range    ABO Grouping A     Rh Factor Positive     Antibody Screen Negative     Specimen Expiration Date [id number]    Troponin I    Collection Time: 07/17/18 10:47 PM   Result Value Ref Range    Troponin I 0 02 <=0 04 ng/mL   Urinalysis with reflex to microscopic    Collection Time: 07/18/18 12:02 AM   Result Value Ref Range    Color, UA Yellow     Clarity, UA Clear     Specific Gravity, UA >1 045 (H) 1 003 - 1 030    pH, UA 7 0 4 5 - 8 0    Leukocytes, UA Negative Negative    Nitrite, UA Negative Negative    Protein, UA 30 (1+) (A) Negative mg/dl    Glucose, UA Negative Negative mg/dl    Ketones, UA Negative Negative mg/dl    Urobilinogen, UA 1 0 0 2, 1 0 E U /dl E U /dl    Bilirubin, UA Negative Negative    Blood, UA Negative Negative   Urine Microscopic    Collection Time: 07/18/18 12:02 AM   Result Value Ref Range    RBC, UA 4-10 (A) None Seen, 0-5 /hpf    WBC, UA 4-10 (A) None Seen, 0-5, 5-55, 5-65 /hpf    Epithelial Cells None Seen None Seen, Occasional /hpf    Bacteria, UA None Seen None Seen, Occasional /hpf    Hyaline Casts, UA 3-5 (A) None Seen /lpf   Lipid Panel with Direct LDL reflex    Collection Time: 07/18/18  4:39 AM   Result Value Ref Range    Cholesterol 133 50 - 200 mg/dL    Triglycerides 61 <=150 mg/dL    HDL, Direct 46 40 - 60 mg/dL    LDL Calculated 75 0 - 100 mg/dL   Hemoglobin A1c w/EAG Estimation    Collection Time: 07/18/18  4:39 AM   Result Value Ref Range    Hemoglobin A1C 5 7 4 2 - 6 3 %     mg/dl     Xr Wrist 3+ Views Left    Result Date: 7/17/2018  Narrative: LEFT WRIST INDICATION:   fall, swelling pain  "FALL, SWELLING POSTERIORLY ON WRIST" COMPARISON:  None VIEWS:  XR WRIST 3+ VW LEFT FINDINGS: There is no acute fracture or dislocation  Chronic healed distal ulnar deformity  No significant degenerative changes  No lytic or blastic lesions are seen  Diffuse soft tissue swelling most prominent posteriorly  Impression: No acute osseous abnormality  Workstation performed: [id number]     Xr Tibia Fibula 2 Vw Left    Result Date: 7/18/2018  Narrative: LEFT TIBIA AND FIBULA INDICATION:   Recent fall, left ankle fracture  Evaluate for additional injury to the left lower extremity   COMPARISON:  Left ankle 7/17/2018 VIEWS:  XR TIBIA FIBULA 2 VW LEFT Images: 4 FINDINGS: Nondisplaced fracture of the distal fibula is again noted  No evidence of fracture of the tibia or proximal fibula  No significant degenerative changes seen  No lytic or blastic lesions are seen  Soft tissue swelling overlying the lateral malleolus  Impression: Nondisplaced distal fibular fracture  Negative for proximal tibia-fibula fracture  Workstation performed: [id number]     Xr Ankle 3+ Views Left    Result Date: 7/17/2018  Narrative: LEFT ANKLE INDICATION:   fall, tenderness lateral malleolus   " fall, lateral swelling around malleolus" COMPARISON:  None VIEWS:  XR ANKLE 3+ VW LEFT FINDINGS: Nondisplaced fracture of the distal fibula just above the ankle joint space  No significant degenerative changes  No lytic or blastic lesions seen  Prominent soft tissue swelling overlying the fracture site  Impression: Nondisplaced fracture of the distal fibula  The study was marked in EPIC for significant notification  Workstation performed: [id number]     Xr Finger Second Digit-index Right    Result Date: 7/17/2018  Narrative: RIGHT FINGER INDICATION:   trauma  "FALL, SWELLING AND BRUISING ALL THROUGH SECOND PHALANGE" COMPARISON:  None VIEWS:  XR FINGER RIGHT SECOND DIGIT-INDEX For the purposes of institution wide universal language the following terms will apply: (thumb=1st digit/finger, index finger=2nd digit/finger, long finger=3rd digit/finger, ring=4th digit/finger and small finger=5th digit/finger) FINDINGS: There is no acute fracture or dislocation  No significant degenerative changes  No lytic or blastic lesion  Diffuse 2nd digit soft tissue swelling most prominent dorsal to the proximal interphalangeal joint space  Impression: No fracture  Workstation performed: [id number]     Xr Stroke Alert Portable Chest    Result Date: 7/17/2018  Narrative: CHEST INDICATION:   stroke   COMPARISON:  AP chest 12/4/2015 EXAM PERFORMED/VIEWS:  XR STROKE ALERT PORTABLE CHEST FINDINGS: Cardiomediastinal silhouette appears unremarkable  The lungs are clear  No pneumothorax or pleural effusion  Stable severe dextroscoliosis     Impression: No acute cardiopulmonary disease  Workstation performed: [id number]     Ct Stroke Alert Brain    Result Date: 7/17/2018  Narrative: CT BRAIN - STROKE ALERT PROTOCOL INDICATION:   Stroke alert  COMPARISON:  CT brain dated December 5, 2015  TECHNIQUE:  CT examination of the brain was performed  In addition to axial images, coronal reformatted images were created and submitted for interpretation  Radiation dose length product (DLP) for this visit:  1037 mGy-cm   This examination, like all CT scans performed in the [facility], was performed utilizing techniques to minimize radiation dose exposure, including the use of iterative reconstruction and automated exposure control  IMAGE QUALITY:  Diagnostic  FINDINGS:  PARENCHYMA:  No intracranial mass, mass effect or midline shift  No acute intracranial hemorrhage  No CT signs of acute infarction  There is mild periventricular white matter low attenuation which is nonspecific and most likely related to chronic small vessel ischemic changes  VENTRICLES AND EXTRA-AXIAL SPACES:  There is prominence of the ventricles and sulci related to mild volume loss  VISUALIZED ORBITS AND PARANASAL SINUSES:  Unremarkable  CALVARIUM AND EXTRACRANIAL SOFT TISSUES:   Normal      Impression: No acute intracranial abnormality  Mild chronic small vessel ischemic changes and volume loss  Findings were directly discussed with Latisha Burnett on 7/17/2018 4:45 PM  Workstation performed: [id number]     Cta Stroke Alert (head/neck)    Result Date: 7/17/2018  Narrative: CTA NECK AND BRAIN WITH AND WITHOUT CONTRAST INDICATION: Stroke alert COMPARISON:   None  TECHNIQUE: Post contrast imaging was performed after administration of iodinated contrast through the neck and brain   Post contrast axial 0 625 mm images timed to opacify the arterial system  3D rendering was performed on an independent workstation  MIP reconstructions performed  Coronal reconstructions were performed of the noncontrast portion of the brain  Radiation dose length product (DLP) for this visit:  411 42 mGy-cm   This examination, like all CT scans performed in the [facility], was performed utilizing techniques to minimize radiation dose exposure, including the use of iterative  reconstruction and automated exposure control  IV Contrast:  100 mL of iodixanol (VISIPAQUE)  IMAGE QUALITY:   Diagnostic FINDINGS: CERVICAL VASCULATURE AORTIC ARCH AND GREAT VESSELS:  Normal aortic arch and great vessel origins  Normal visualized subclavian vessels  RIGHT VERTEBRAL ARTERY CERVICAL SEGMENT:  Normal origin  The vessel is normal in caliber throughout the neck  LEFT VERTEBRAL ARTERY CERVICAL SEGMENT:  Normal origin  The vessel is normal in caliber throughout the neck  RIGHT EXTRACRANIAL CAROTID SEGMENT:  Normal caliber common carotid artery  There is mild tortuosity of the proximal common carotid artery  Normal bifurcation and cervical internal carotid artery  No stenosis or dissection  LEFT EXTRACRANIAL CAROTID SEGMENT:  Normal caliber common carotid artery  Normal bifurcation  There is mild atherosclerotic plaquing at the carotid bulb causing no hemodynamically significant stenosis  No dissection  NASCET criteria was used to determine the degree of internal carotid artery diameter stenosis  INTRACRANIAL VASCULATURE INTERNAL CAROTID ARTERIES:  Normal enhancement of the intracranial portions of the internal carotid arteries  Normal ophthalmic artery origins  Normal ICA terminus  ANTERIOR CIRCULATION:  Symmetric A1 segments and anterior cerebral arteries with normal enhancement  Normal anterior communicating artery  MIDDLE CEREBRAL ARTERY CIRCULATION:  M1 segment and middle cerebral artery branches demonstrate normal enhancement bilaterally   DISTAL VERTEBRAL ARTERIES:  Normal distal vertebral arteries  Posterior inferior cerebellar artery origins are normal  Normal vertebral basilar junction  BASILAR ARTERY:  Basilar artery is normal in caliber  Normal superior cerebellar arteries  POSTERIOR CEREBRAL ARTERIES: The right posterior cerebral artery arises from the basilar tip  There is fetal origin of the left posterior cerebral artery  Both demonstrate no focal stenosis  DURAL VENOUS SINUSES:  Normal  NON VASCULAR ANATOMY BONY STRUCTURES:  No acute osseous abnormality  SOFT TISSUES OF THE NECK: There are scattered bilateral thyroid nodules measuring up to 14 mm  Incidental discovery of one or more thyroid nodule(s) measuring less than 1 5 cm and without suspicious features is noted in this patient who is above 28years old; according to guidelines published in the February 2015 white paper on incidental thyroid nodules in the Journal of the [employer] of Radiology [facility]), no further evaluation is recommended  THORACIC INLET:  Atelectatic changes are noted at both lung apices  Impression: No acute intracranial abnormality  No focal stenosis or saccular aneurysm within the [place]  No hemodynamically significant stenosis within either common or internal carotid artery  Less than 50% stenosis by NASCET criteria  Unremarkable bilateral vertebral arteries  Workstation performed: [id number]     VTE Prophylaxis: Sequential compression device (Venodyne)     Counseling / Coordination of Care  Total time spent today 25 minutes  Greater than 50% of total time was spent with the patient and / or family counseling and / or coordination of care   A description of the counseling / coordination of care: New to examiner, reviewing records, reviewing reports, physical examination

## 2018-07-18 NOTE — PHYSICAL THERAPY NOTE
Physical Therapy Cancellation Note      PT CONSULT RECEIVED  PATIENT PRESENTS WITH L DISTAL FIBULA FRACTURE AND IS PENDING ORTHOPEDICS EVALUATION  PT WILL HOLD SERVICES AT THIS TIME PENDING THIS EVALUATION AND WBS ORDERS       Kemal Pedersen, PT

## 2018-07-18 NOTE — SOCIAL WORK
CM met with pt at bedside and reviewed role with dcp  Pt resides a lone in a 2nd floor apartment with elevator access  Pt reports she is independent with ADLs and uses a RW for safe ambulation  Pt reports she does not work and does not drive  She reports her family assists with transportation  Pt reports no hx of VNA  Pt reports hx of STR at [facility]  Pt denies hx of MH or drug/alcohol related issues  Pt unsure of pharmacy and reports her family picks up her medication  Pt reports her son - Ardelle Peabody is her main contact  CM to follow for dcp  CM reviewed d/c planning process including the following: identifying help at home, patient preference for d/c planning needs, Discharge Lounge, [facility] Meds to Bed program, availability of treatment team to discuss questions or concerns patient and/or family may have regarding understanding medications and recognizing signs and symptoms once discharged  CM also encouraged patient to follow up with all recommended appointments after discharge  Patient advised of importance for patient and family to participate in managing patients medical well being

## 2018-07-18 NOTE — PHYSICIAN ADVISOR
Current patient class: Inpatient  The patient is currently on Hospital Day: 2 at [facility]      The patient was admitted to the hospital at 1900 on 7/17/18 for the following diagnosis:  Stroke (cerebrum) [facility]) [I63 9]  Left arm weakness [R29 898]  Ataxia of left upper extremity [R27 0]  Received intravenous tissue plasminogen activator (t-PA) in emergency department [Z92 82]       There is documentation in the medical record of an expected length of stay of at least 2 midnights  The patient is therefore expected to satisfy the 2 midnight benchmark and given the 2 midnight presumption is appropriate for INPATIENT ADMISSION  Given this expectation of a satisfying stay, CMS instructs us that the patient is most often appropriate for inpatient admission under part A provided medical necessity is documented in the chart  After review of the relevant documentation, labs, vital signs and test results, the patient is appropriate for INPATIENT ADMISSION  Admission to the hospital as an inpatient is a complex decision making process which requires the practitioner to consider the patients presenting complaint, history and physical examination and all relevant testing  With this in mind, in this case, the patient was deemed appropriate for INPATIENT ADMISSION  After review of the documentation and testing available at the time of the admission I concur with this clinical determination of medical necessity  Rationale is as follows:    80 y  o  female who is hard of hearing with no significant PMH, medications or allergies who presented to the ED with stroke like symptoms  According to EMS, the patient fell at approximately   She required a police assist to stand up but apparently at that time she had no notable deficits  Approximately 20 minutes prior to arrival, the patient's son came to her home and found her on the floor again   EMS was called who noted L sided weakness, dysarthria and occasional alteration in MS  Per EMS vital signs were stable with /60 HR 72 NSR  CT head and CTA were negative for acute abnormality or large vessel occlusion or flow limiting stenosis   Pt to got TPA in ER and is admitted for frequent neuro checks  MRI and ECHO pending  Vanesa Lombardo the findings above, the need for further hospitalization along with the documentation of medical necessity present in this chart, I feel this patient is appropriate for > 2 midnight inpatient admission  The patients vitals on arrival were ED Triage Vitals   Temperature Pulse Respirations Blood Pressure SpO2   07/17/18 1637 07/17/18 1636 07/17/18 1636 07/17/18 1636 07/17/18 1636   99 3 °F (37 4 °C) (!) 107 18 107/73 97 %      Temp Source Heart Rate Source Patient Position - Orthostatic VS BP Location FiO2 (%)   07/17/18 1637 07/17/18 1826 07/17/18 1826 07/17/18 1826 --   Tympanic Monitor Lying Left arm       Pain Score       07/17/18 2000       3           Past Medical History:   Diagnosis Date    Arthritis     scoliosis     Past Surgical History:   Procedure Laterality Date    ABDOMINAL SURGERY      bowel surgery approx   2003    JOINT REPLACEMENT Left 2015    hip           Consults have been placed to:   IP CONSULT TO NEUROLOGY  IP CONSULT TO NEUROLOGY  IP CONSULT TO PHYSICAL MEDICINE REHAB  IP CONSULT TO NEUROLOGY  IP CONSULT TO CASE MANAGEMENT  IP CONSULT TO NUTRITION SERVICES  IP CONSULT TO ORTHOPEDIC SURGERY    Vitals:    07/18/18 0920 07/18/18 1020 07/18/18 1120 07/18/18 1220   BP:  145/65 122/62 131/70   BP Location:  Right arm Right arm Right arm   Pulse: 74 68 66 70   Resp: 22 22 (!) 23 (!) 24   Temp:       TempSrc:       SpO2: 91% 95% 96% 97%   Weight:       Height:           Most recent labs:    Recent Labs      07/17/18   1644  07/17/18   2247   WBC  5 86   --    HGB  14 1   --    HCT  44 2   --    PLT  210   --    K  4 1   --    NA  144   --    CALCIUM  8 9   --    BUN  21   --    CREATININE 0 75   --    INR  1 00   --    TROPONINI  <0 02  0 02   AST  27   --    ALT  22   --    ALKPHOS  68   --    BILITOT  0 45   --        Scheduled Meds:  Current Facility-Administered Medications:  albuterol 2 5 mg Nebulization Q4H PRN Johanna Donaldson MD    atorvastatin 40 mg Oral QPM Abram Ovalle MD    nystatin  Topical BID Mariela Marie MD    sodium chloride 75 mL/hr Intravenous Continuous Abram Ovalle MD Last Rate: 75 mL/hr (07/18/18 0848)     Continuous Infusions:  sodium chloride 75 mL/hr Last Rate: 75 mL/hr (07/18/18 0848)     PRN Meds:   albuterol    Surgical procedures (if appropriate):

## 2018-07-18 NOTE — PLAN OF CARE
Patient and family oriented to room/unit and expected treatment/monitoring and assessments performed by staff  Standard skin and safety precautions adequate   Ice and elevation to fractured LLE adequate at this time,

## 2018-07-18 NOTE — PLAN OF CARE
Problem: Nutrition/Hydration-ADULT  Goal: Nutrient/Hydration intake appropriate for improving, restoring or maintaining nutritional needs  Monitor and assess patient's nutrition/hydration status for malnutrition (ex- brittle hair, bruises, dry skin, pale skin and conjunctiva, muscle wasting, smooth red tongue, and disorientation)  Collaborate with interdisciplinary team and initiate plan and interventions as ordered  Monitor patient's weight and dietary intake as ordered or per policy  Utilize nutrition screening tool and intervene per policy  Determine patient's food preferences and provide high-protein, high-caloric foods as appropriate       INTERVENTIONS:  - Monitor oral intake, urinary output, labs, and treatment plans  - Assess nutrition and hydration status and recommend course of action  - Evaluate amount of meals eaten  - Assist patient with eating if necessary   - Allow adequate time for meals  - Recommend/ encourage appropriate diets, oral nutritional supplements, and vitamin/mineral supplements  - Order, calculate, and assess calorie counts as needed  - Recommend, monitor, and adjust tube feedings and TPN/PPN based on assessed needs  - Assess need for intravenous fluids  - Provide specific nutrition/hydration education as appropriate  - Include patient/family/caregiver in decisions related to nutrition  Outcome: Progressing  Awaiting rx for oral diet as per recs from [address] [place] [address] Dr; NPO at this time

## 2018-07-18 NOTE — CONSULTS
Orthopedics   Harris Cooley 80 y o  female MRN: [id number]  Unit/Bed#: ICU 14-01      Chief Complaint:   left ankle pain    HPI:  80 y  o female status post fall s/p CVA complaining of right ankle pain and inability to bear weight  Patient has a history of multiple falls and was found down at her home by her son with apparent left sided weakness and slurred speech  She received tPA for an assumed stroke  Patient has pain about the left ankle which was wrapped and splinted at the time of this examination  Exam is limited by patient's altered mental status, but she is able to follow commands  Pain is well localized about the left ankle, is worse with attempted range of motion or palpation and improves with rest  She has no other complaints at this time  Review Of Systems:   · Skin: Normal  · Neuro: See HPI  · Musculoskeletal: See HPI  · 14 point review of systems negative except as stated above     Past Medical History:   Past Medical History:   Diagnosis Date    Arthritis     scoliosis       Past Surgical History:   Past Surgical History:   Procedure Laterality Date    ABDOMINAL SURGERY      bowel surgery approx  2003    JOINT REPLACEMENT Left 2015    hip       Family History:  Family history reviewed and non-contributory  History reviewed  No pertinent family history      Social History:  Social History     Social History    Marital status: Single     Spouse name: N/A    Number of children: N/A    Years of education: N/A     Social History Main Topics    Smoking status: Never Smoker    Smokeless tobacco: Never Used    Alcohol use No    Drug use: No    Sexual activity: No     Other Topics Concern    None     Social History Narrative    None       Allergies:   No Known Allergies        Labs:    0  Lab Value Date/Time   HCT 44 2 07/17/2018 1644   HCT 28 9 (L) 12/07/2015 0500   HCT 35 1 12/06/2015 0545   HCT 46 7 (H) 12/05/2015 0600   HGB 14 1 07/17/2018 1644   HGB 14 3 07/17/2018 1641   HGB 9 5 (L) 12/07/2015 0500   HGB 11 5 12/06/2015 0545   HGB 15 7 (H) 12/05/2015 0600   INR 1 00 07/17/2018 1644   INR 1 07 12/04/2015 1745   WBC 5 86 07/17/2018 1644   WBC 6 57 12/07/2015 0500   WBC 7 32 12/06/2015 0545   WBC 8 72 12/05/2015 0600       Meds:    Current Facility-Administered Medications:     albuterol inhalation solution 2 5 mg, 2 5 mg, Nebulization, Q4H PRN, Inocente Bush MD    atorvastatin (LIPITOR) tablet 40 mg, 40 mg, Oral, QPM, Josefina Dubose MD, 40 mg at 07/17/18 2221    nystatin (MYCOSTATIN) powder, , Topical, BID, Floresita Tello MD    sodium chloride 0 9 % infusion, 75 mL/hr, Intravenous, Continuous, Josefina Dubose MD, Last Rate: 75 mL/hr at 07/18/18 0848, 75 mL/hr at 07/18/18 0848    Blood Culture:   No results found for: BLOODCX    Wound Culture:   No results found for: WOUNDCULT    Ins and Outs:  I/O last 24 hours: In: 3303 [P O :100; I V :1075]  Out: 1300 [Urine:1300]          Physical Exam:   /70 (BP Location: Right arm)   Pulse 70   Temp 98 8 °F (37 1 °C) (Oral)   Resp (!) 24   Ht 4' 11" (1 499 m)   Wt 48 4 kg (106 lb 11 2 oz)   SpO2 97%   BMI 21 55 kg/m²   Gen: Alert and oriented to person, place, but not time or president   HEENT: EOMI, eyes clear, moist mucus membranes, hearing intact  Respiratory: Bilateral chest rise  No audible wheezing found  Cardiovascular: No palpable arrhythmia  Abdomen: soft nontender/nondistended  Musculoskeletal: left lower extremity  · Splint was removed for evaluation   · Skin intact, ecchymosis over lateral malleolus   · Tender to palpation over lateral malleoli  · Positive squeeze test   · Painful ankle range of motion  · 2+ DP  · Sensation intact L4-S1  · Positive EHL/FHL  TA and GS limited by pain     Radiology:   I personally reviewed the films  X-rays left ankle shows fracture of the lateral malleolus at the level of the syndesmosis   A stress view was obtained which showed no medial clear space widening and an appropriate amount of tib-fib overlap        _*_*_*_*_*_*_*_*_*_*_*_*_*_*_*_*_*_*_*_*_*_*_*_*_*_*_*_*_*_*_*_*_*_*_*_*_*_*_*_*_*    Assessment:  80 y  o female status post fall 2/2 stroke with left ankle fracture    Plan:   · NWB left lower extremity in CAM boot walker   · Pain meds per primary team   · PT/OT  · Local modalities: Rest, ice, compression, elevation   · No planned operative intervention at this time   · Dispo: Ortho will follow    Iris Hernandez MD

## 2018-07-18 NOTE — PROGRESS NOTES
Progress Note - Critical Care   Edmundo Lockwood 80 y o  female MRN: [id number]  Unit/Bed#: ICU 14 Encounter: [id number]    Assessment: Edmundo Lockwood is a 80 y o  female who presents status post multiple falls, left-sided weakness with slurred speech and altered mental status status post tPA  TPA was given approximately at 5:20 p m  07/17/2018  Nondisplaced fracture of the distal fibula  Plan:          Neuro:   - patient received tPA  Follow stroke pathway and repeat CT of the had 24 hours post tPA administration to assess for any bleed ( 1700 hrs today)  Patient will also need MRI  - frequent neuro checks, Neurology is consulted  CV:   - no known medical history however will allow patient to have permissive hypertension of systolic blood pressure of 160  Lipid profile this morning was within normal limits  Lung:   - no active issues at this time current the patient is saturating well on room air                 GI:   - no active issues at this time                 FEN:   - IVF at 75 mL/hr normal saline  Monitor electrolytes daily with a goal potassium of greater than 4 phosphorus greater than 3 and magnesium greater than 2 will replete as necessary  Patient is currently NPO                 :  UA is suggestive of a UTI pending urine culture                 ID:  No active issues at this time pending urine culture                 Heme:  No active issues at this time                 Endo:  No active issues at this time will follow up with HbA1c                            Msk/Skin:  Patient has a splint in place for distal fibula fracture, continue to monitor signs and symptoms for compartment syndrome especially in the setting of tPA administration  Patient also had multiple ecchymoses covering her upper extremities lesions were marked in pictures taken    Will encourage getting out of bed and working with PT OT                Disposition:  ICU monitoring    Chief Complaint: Frequent falls altered mental status    HPI/24hr events:  Patient was admitted yesterday due to multiple falls left-sided weakness and slurring of speech as well as altered mental status  Patient received tPA at approximately 1700 hours yesterday  Currently patient is doing significantly better patient will have a repeat CT today at 1700 hours to assess for bleed  Splint was applied to patient's left upper extremity  Physical Exam:   General pleasant 35-year-old female no acute distress   Patient is oriented to person place and time well-developed Will nutrition  H EENT:  Normocephalic atraumatic, normal conjunctiva extraocular movements intact pupils are equal and reactive to light and accommodation, neck has normal range of motion no JVD appreciated  Cardiovascular:  Normal rate regular rhythm neuro are sounds intact distal pulses no gallops or friction rubs no murmurs  Pulmonary/chest:  Normal effort no stridor no respiratory distress no wheezing rhonchi or rales  Abdominal:  Abdomen is soft nontender without rebound or guarding normal bowel sounds are heard  Musculoskeletal:  Patient has edema swelling of the left wrist left lower extremity surrounding the fracture patient is tender at this area  Neurological:  Patient is oriented to person place and time no focal neurological deficit cranial nerves 2-12 are grossly intact patient has normal muscle tone coordination is normal  Skin:  Multiple ecchymoses covering patient's bilateral upper extremities  Reviewed nursing note and vitals    Vitals:    18 0400 18 0430 18 0500 18 0530   BP: 138/68 125/65 137/72 135/67   BP Location: Right arm      Pulse: 70 66 66 64   Resp: 22 20 (!) 24 21   Temp: 98 4 °F (36 9 °C)      TempSrc: Oral      SpO2: 95% 92% 97% 96%   Weight:       Height:                 Temperature:   Temp (24hrs), Av 5 °F (36 9 °C), Min:97 8 °F (36 6 °C), Max:99 3 °F (37 4 °C)    Current: Temperature: 98 4 °F (36 9 °C)    Weights:   IBW: 43 2 kg    Body mass index is 21 55 kg/m²  Weight (last 2 days)     Date/Time   Weight    07/17/18 2000  48 4 (106 7)    07/17/18 1705  51 5 (113 54)              Hemodynamic Monitoring:  N/A     Non-Invasive/Invasive Ventilation Settings:  Respiratory    Lab Data (Last 4 hours)    None         O2/Vent Data (Last 4 hours)    None              No results found for: PHART, DWA1LQW, PO2ART, NIK9YDY, E7FSAGNS, BEART, SOURCE  SpO2: SpO2: 96 %    Intake and Outputs:  I/O       07/16 0701 - 07/17 0700 07/17 0701 - 07/18 0700    P  O   100    I V  (mL/kg)  768 8 (15 9)    Total Intake(mL/kg)  868 8 (17 9)    Urine (mL/kg/hr)  1150    Total Output   1150    Net   -281 3              Nutrition:        Diet Orders            Start     Ordered    07/17/18 1921  Diet NPO; Sips with meds  Diet effective now     Question Answer Comment   Diet Type NPO    NPO Except: Sips with meds    RD to adjust diet per protocol? Yes        07/17/18 1927          Labs:     Results from last 7 days  Lab Units 07/17/18  1644 07/17/18  1641   WBC Thousand/uL 5 86  --    HEMOGLOBIN g/dL 14 1  --    I STAT HEMOGLOBIN g/dl  --  14 3   HEMATOCRIT % 44 2  --    PLATELETS Thousands/uL 210  --       Results from last 7 days  Lab Units 07/17/18  1644 07/17/18  1641   SODIUM mmol/L 144  --    POTASSIUM mmol/L 4 1  --    CHLORIDE mmol/L 109*  --    CO2 mmol/L 30  --    BUN mg/dL 21  --    CREATININE mg/dL 0 75  --    CALCIUM mg/dL 8 9  --    TOTAL PROTEIN g/dL 6 9  --    BILIRUBIN TOTAL mg/dL 0 45  --    ALK PHOS U/L 68  --    ALT U/L 22  --    AST U/L 27  --    GLUCOSE RANDOM mg/dL 149*  --    GLUCOSE, ISTAT mg/dl  --  152*                Results from last 7 days  Lab Units 07/17/18  1644   INR  1 00   PTT seconds 32           0  Lab Value Date/Time   TROPONINI 0 02 07/17/2018 2247   TROPONINI <0 02 07/17/2018 1644       Imaging:   Xr Wrist 3+ Views Left    Result Date: 7/17/2018  Impression: No acute osseous abnormality   Workstation performed: [id number]     Xr Ankle 3+ Views Left    Result Date: 7/17/2018  Impression: Nondisplaced fracture of the distal fibula  The study was marked in EPIC for significant notification  Workstation performed: [id number]     Xr Finger Second Digit-index Right    Result Date: 7/17/2018  Impression: No fracture  Workstation performed: [id number]     Xr Stroke Alert Portable Chest    Result Date: 7/17/2018  Impression: No acute cardiopulmonary disease  Workstation performed: [id number]     Ct Stroke Alert Brain    Result Date: 7/17/2018  Impression: No acute intracranial abnormality  Mild chronic small vessel ischemic changes and volume loss  Findings were directly discussed with Rosy Gonzalez on 7/17/2018 4:45 PM  Workstation performed: [id number]     Cta Stroke Alert (head/neck)    Result Date: 7/17/2018  Impression: No acute intracranial abnormality  No focal stenosis or saccular aneurysm within the [place]  No hemodynamically significant stenosis within either common or internal carotid artery  Less than 50% stenosis by NASCET criteria  Unremarkable bilateral vertebral arteries  Workstation performed: [id number]    I have personally reviewed pertinent reports  EKG: Will order ECG    Micro:  No results found for: Miladys Partida, WOUNDCULT, SPUTUMCULTUR    Allergies: No Known Allergies    Medications:   Scheduled Meds:  Current Facility-Administered Medications:  atorvastatin 40 mg Oral QPM Meagan Whitfield MD    sodium chloride 75 mL/hr Intravenous Continuous Meagan Whitfield MD Last Rate: 75 mL/hr (07/17/18 2028)     Continuous Infusions:  sodium chloride 75 mL/hr Last Rate: 75 mL/hr (07/17/18 2028)     PRN Meds:       VTE Pharmacologic Prophylaxis: Reason for no pharmacologic prophylaxis Patient just received tPA  VTE Mechanical Prophylaxis: sequential compression device    Invasive lines and devices:   Invasive Devices     Peripheral Intravenous Line            Peripheral IV 07/17/18 Right Antecubital 1 day    Peripheral IV 07/17/18 Left Antecubital less than 1 day                   Counseling / Coordination of Care  Total Critical Care time spent 46 minutes excluding procedures, teaching and family updates  Code Status: Level 1 - Full Code     Portions of the record may have been created with voice recognition software  Occasional wrong word or "sound a like" substitutions may have occurred due to the inherent limitations of voice recognition software  Read the chart carefully and recognize, using context, where substitutions have occurred       Dwayne France MD

## 2018-07-18 NOTE — PROGRESS NOTES
Occupational Therapy Cancel Note:  OT orders received and chart reviewed   Await ortho eval and plan of care prior to OT eval  Will reattempt as appropriate for eval and assist DC plans  [facility] RONA MONTERO, VIGNESH/L

## 2018-07-18 NOTE — MEDICAL STUDENT
Progress Note - Critical Care   Juliocesar Merinor 80 y o  female MRN: [id number]  Unit/Bed#: ICU 14 Encounter: [id number]    Assessment: Inell Artist is an 81 y/o F who presented to [facility]"" on 7/17/18 with left sided weakness, mild slurred speech, and confusion  On 7/17/18 patient fell at her home and EMS was called for lift assist  During this evaluation, EMS did not note any abnormalities  Patient denied any trauma to her head, LOC, and patient is not on any anticoagulation medications  EMS then left the patient's home around 1530  Approximately 20 minutes later, the patient's son arrived to her home, and noted her to be on the floor again, this time with left sided weakness, slurred speech, and altered mental status  Patient was brought to \[facility]\"" as stroke alert  Initial CT and CTA of head were unremarkable  NIHSS of 5 on admission, primarily due to left upper extremity ataxia rather than weakness  The patient received tPA at approximately 1720  Patient also complained of left lower extremity pain  Xray was preformed which revealed nondisplaced fracture of the left distal fibula  Patient has no significant PMH  Plan:          Neuro: Acute CVA  Patient presented to \[facility]\"" with NIHSS of 5  Patient received systemic tPA for left sided ataxia and slurred speech  [facility] on 7/17/18 revealed: Mild chronic small vessel ischemic changes and volume loss  CTA on 7/17/18 revealed: No acute intracranial abnormality  No focal stenosis or saccular aneurysm within the [place]  No hemodynamically significant stenosis within either common or internal carotid artery  Less than 50% stenosis by NASCET criteria  Unremarkable bilateral vertebral arteries   - SBP Goal <180/105 given s/p tPA  BP have been in range, below 180 since 1900  Consider Labetalol 5mg PO Q4H PRN as needed for BP goal    - Follow up [facility] this evening, at 5:30pm at earliest for follow up s/p systemic tPA infusion   - MRI Brain today     - STAT Head CT with acute change in mental status    - TTE ECHO  - Continue Neurochecks Q1H    - Continue bedrest with bathroom privileges at this time  - Consider Aspirin 24Hrs post tPA (1700) as appropriate    - Neurology following  Analgesia: Consider Tylenol 650mg Q6H PRN for pain  CV: Patient denied any chest pain on physical exam this morning  Patient has no history of CVD  - Follow up results of TTE    - Lipid Panel revealed 133/61/46/75  Given stroke history patient placed on Atorvastatin 40mg PO   - Continue BP control and goal as outlined above  Lung: Patient has no history of pulmonary disease    - CXR on 7/17/18 revealed no acute cardiopulmonary disease    - Encourage incentive spirometry Q1H while awake  - Patient is saturating 92-98% in Room Air  GI: Senna-Docusate [address] and Miralax PRN for bowel regimen once patient is placed on diet  Hold for time being    - No other active issues  FEN: NS 75mL/hr currently running   - Na 144, K 4 1, BUN 21, Cr 0 75    - Mg >2, Phos >3, K >4   - Draw Mg and Phos labs  - Patient is currently NPO  : Patient is voiding appropriately  Current UOP 1 5cc/kg/hr  ID: No active issues  Tmax since admission of 99 3 farenheit, current WBC of 5 86  Continue to monitor vitals and trend white blood cell count  Heme: Fecal Occult Blood pending  No pharmacological PPX due to systemic tPA  Consider restart around 1700  Continue SCDs for DVT prophylaxis  Endo: Hemoglobin A1C pending  No other active issues  Msk/Skin: Continue frequent offloading and repositioning of the patient  Monitor for skin breakdown  Scattered ecchymosis 2/2 fall at home, POA  Consider ortho consult for distal fibular fracture  Monitor LUE hematoma expansion   Consider US LUE if continues to expand    - XR Left Ankle on 7/17/18 revealed Nondisplaced fracture of the distal fibula  - XR Right Second Finger on 18 revealed No fracture  - XR Left Wrist on 18 revealed No acute osseous abnormality  Disposition: Continue ICU level of care  PT/OT/Speech/PM&R evaluation placed  Chief Complaint: None  HPI/24hr events: No acute events overnight  Patient has been resting  No pain meds required for her  Physical Exam: Physical Exam   Constitutional: She is cooperative  HENT:   Head: Normocephalic and atraumatic  Eyes: Conjunctivae, EOM and lids are normal  Pupils are equal, round, and reactive to light  Neck: No JVD present  No tracheal deviation present  Cardiovascular: Normal rate, regular rhythm, normal heart sounds and intact distal pulses  No LE edema present  Pulmonary/Chest: Effort normal and breath sounds normal  No respiratory distress  Abdominal: Soft  Bowel sounds are normal  She exhibits no distension  There is no tenderness  Neurological: She is alert  A cranial nerve deficit is present  AAO x 2 (Person and Time)  SKV56  CN II-XII intact  No facial droop noted, no dysarthria  Strength 5/5 in RUE, 4/5 in LUE  Strength 5/5 in RLE, limited in LLE due to fibula fracture and ace bandage wrap  No new numbness, tingling, weakness  Skin: Skin is warm and dry  Bruising and ecchymosis noted  Expanding left UE hematoma  Scattered ecchymosis  LLE ace bandage wrap in place for distal fibular fracture  Psychiatric: She has a normal mood and affect           Vitals:    18 0400 18 0430 18 0500 18 0530   BP: 138/68 125/65 137/72 135/67   BP Location: Right arm      Pulse: 70 66 66 64   Resp: 22 20 (!) 24 21   Temp: 98 4 °F (36 9 °C)      TempSrc: Oral      SpO2: 95% 92% 97% 96%   Weight:       Height:                 Temperature:   Temp (24hrs), Av 5 °F (36 9 °C), Min:97 8 °F (36 6 °C), Max:99 3 °F (37 4 °C)    Current: Temperature: 98 4 °F (36 9 °C)    Weights:   IBW: 43 2 kg    Body mass index is 21 55 kg/m²  Weight (last 2 days)     Date/Time   Weight    07/17/18 2000  48 4 (106 7)    07/17/18 1705  51 5 (113 54)              Hemodynamic Monitoring:  N/A     Non-Invasive/Invasive Ventilation Settings:  Respiratory    Lab Data (Last 4 hours)    None         O2/Vent Data (Last 4 hours)    None              No results found for: PHART, CHV2FCN, PO2ART, VWY0FFH, O3ZXTOZW, BEART, SOURCE    Intake and Outputs:  I/O       07/16 0701 - 07/17 0700 07/17 0701 - 07/18 0700    P  O   100    I V  (mL/kg)  768 8 (15 9)    Total Intake(mL/kg)  868 8 (17 9)    Urine (mL/kg/hr)  1150    Total Output   1150    Net   -281 3              UOP: 1 5cc/kg/hour   Nutrition:        Diet Orders            Start     Ordered    07/17/18 1921  Diet NPO; Sips with meds  Diet effective now     Question Answer Comment   Diet Type NPO    NPO Except: Sips with meds    RD to adjust diet per protocol? Yes        07/17/18 1927          Labs:     Results from last 7 days  Lab Units 07/17/18  1644 07/17/18  1641   WBC Thousand/uL 5 86  --    HEMOGLOBIN g/dL 14 1  --    I STAT HEMOGLOBIN g/dl  --  14 3   HEMATOCRIT % 44 2  --    PLATELETS Thousands/uL 210  --       Results from last 7 days  Lab Units 07/17/18  1644 07/17/18  1641   SODIUM mmol/L 144  --    POTASSIUM mmol/L 4 1  --    CHLORIDE mmol/L 109*  --    CO2 mmol/L 30  --    BUN mg/dL 21  --    CREATININE mg/dL 0 75  --    CALCIUM mg/dL 8 9  --    TOTAL PROTEIN g/dL 6 9  --    BILIRUBIN TOTAL mg/dL 0 45  --    ALK PHOS U/L 68  --    ALT U/L 22  --    AST U/L 27  --    GLUCOSE RANDOM mg/dL 149*  --    GLUCOSE, ISTAT mg/dl  --  152*                Results from last 7 days  Lab Units 07/17/18  1644   INR  1 00   PTT seconds 32           0  Lab Value Date/Time   TROPONINI 0 02 07/17/2018 2247   TROPONINI <0 02 07/17/2018 1644       Imaging: Reviewed  I have personally reviewed pertinent reports     and I have personally reviewed pertinent films in PACS      Micro:  No results found for: Bowen Thibodeaux, WOUNDCULT, SPUTUMCULTUR    Allergies: No Known Allergies    Medications:   Scheduled Meds:  Current Facility-Administered Medications:  atorvastatin 40 mg Oral QPM Rossi Rojas MD    sodium chloride 75 mL/hr Intravenous Continuous Rossi Rojas MD Last Rate: 75 mL/hr (07/17/18 2028)     Continuous Infusions:  sodium chloride 75 mL/hr Last Rate: 75 mL/hr (07/17/18 2028)     PRN Meds:       VTE Pharmacologic Prophylaxis: Sequential compression device (Venodyne)   VTE Mechanical Prophylaxis: sequential compression device    Invasive lines and devices: Invasive Devices     Peripheral Intravenous Line            Peripheral IV 07/17/18 Right Antecubital 1 day    Peripheral IV 07/17/18 Left Antecubital less than 1 day                   Counseling / Coordination of Care  Total Critical Care time spent 35 minutes excluding procedures, teaching and family updates  Code Status: Level 1 - Full Code     Portions of the record may have been created with voice recognition software  Occasional wrong word or "sound a like" substitutions may have occurred due to the inherent limitations of voice recognition software  Read the chart carefully and recognize, using context, where substitutions have occurred       Laura Acuña

## 2018-07-18 NOTE — SPEECH THERAPY NOTE
Speech Language/Pathology  Speech/Language Pathology  Assessment    Patient Name: Edmundo JEANPI'O Date: 7/18/2018     Problem List  Patient Active Problem List   Diagnosis    Acute [facility]A (cerebrovascular accident) [facility])    Fall    Ataxia    Closed fracture of distal end of left fibula    Ecchymosis     Past Medical History  Past Medical History:   Diagnosis Date    Arthritis     scoliosis     Past Surgical History  Past Surgical History:   Procedure Laterality Date    ABDOMINAL SURGERY      bowel surgery approx  2003    JOINT REPLACEMENT Left 2015    hip       Summary:  Pt presents w/ a minimal oropharyngeal dysphagia doyle by slightly reduced mastication  Pt  demonstrated adequate bolus control and transfer  Swallow was prompt c slightly reduced laryngeal rise  Cough was noted 1x on sip of thin, but symptoms resolved w/ consecutive trials of thin  Recommendations:  Diet: Level 2 dysphagia  Liquid: Thin   Meds: Crush w/ puree   Supervision: Full assist   Positioning: Upright  Strategies: Pt to take PO/Meds only when fully alert and upright  Aspiration precautions  Reflux precautions    Therapy Prognosis: Good  Prognosis Factors: Family support   F/u 1x as appropriate and able    HPI: Edmundo Lockwood is a 80 y o  female with no past medical history but does not regularly see a doctor who presents to [facility] Emergency room on 7/17/18 after multiple falls at home  Patient initially fell at home and had EMS come to her house for lift assist   At that time EMS noted no abnormalities, she did not hit her head, and she is not on any blood thinning medications  EMS left the patient at her residence at approximately , which was her last known normal   Approximately 20 minutes later the patient's son showed up and noted that she again was on the floor but this time had left-sided weakness with slurred speech and altered mental status    911 was called and the patient was brought to the emergency department as a pre-hospital stroke alert  Initial CT and CTA of the patient's head and neck were unremarkable  She had an NIHS score of five and was given tPA at approximately 1720  The points she received on the NIHS score were mainly for her left upper extremity ataxia rather than weakness  Patient did have some pain in her left lower extremity, which an x-ray showed a fractured left fibula  The patient had a splint placed in the emergency department  Patient currently not complaining of any pain elsewhere and she denies any headaches, change of vision, numbness, tingling, or weakness  Reason for consult:  R/o aspiration  Determine safest and least restrictive diet  Change in mental status  New neuro event      Current diet: NPO; sips w/ meds    Premorbid diet: Regular w/ thin; pt  noted she usually eats softer foods like TV dinners unless her son comes over to make food for her     Previous VBS: -    O2 requirement: RA    Voice/Speech: Pt 's voice was quiet  Pt  talked quickly and speech was jumbled and largely unintelligible  Social: Lives at home alone in [facility]  Family lives nearby in [address] [facility] commands: Pt  was able to follow commands                          Cognitive Status: Pt  was awake and alert laying in bed  Oral mech exam:  Nearly edentulous; pt  owns dentures but does not have them here w/ her  Full symmetry  Slight facial weakness overall    Items administered:  Puree, soft solid, nectar thick liquid, thin liquids  Liquids were taken by straw/cup       Oral stage:  Lip closure: WFL  Mastication: Slightly reduced due to lack of dentition   Bolus formation: Adequate   Bolus control: Adequate   Transfer: Prompt transfer  Oral residue: Mild oral residue noted c solid  Pocketing: None noted     Pharyngeal stage:  Swallow promptness: Swallow was prompt   Laryngeal rise: Slightly reduced laryngeal rise   Wet voice: -  Throat clear: -  Cough: noted 1x on thin by straw  Secondary swallows: -  Audible swallows: -    Esophageal stage:  No s/s reported    Aspiration precautions posted    Results d/w:  Pt, nursing    Goal(s):  Pt will tolerate least restrictive diet w/out s/s aspiration or oral/pharyngeal difficulties

## 2018-07-18 NOTE — PROGRESS NOTES
Medical Critical Care Attending Progress Note    ACE wrap applied to LUE at site of expanded ecchymosis/ hematoma  Arm elevated on pillow  Intact distal pulses

## 2018-07-18 NOTE — CASE MANAGEMENT
Initial Clinical Review    Admission: Date/Time/Statement: 7/17/18 @ 1900     Orders Placed This Encounter   Procedures    Inpatient Admission     Standing Status:   Standing     Number of Occurrences:   1     Order Specific Question:   Admitting Physician     Answer:   Shauna Burgos     Order Specific Question:   Level of Care     Answer:   Critical Care [15]     Order Specific Question:   Estimated length of stay     Answer:   More than 2 Midnights     Order Specific Question:   Certification     Answer:   I certify that inpatient services are medically necessary for this patient for a duration of greater than two midnights  See H&P and MD Progress Notes for additional information about the patient's course of treatment  ED: Date/Time/Mode of Arrival:   ED Arrival Information     Expected Arrival Acuity Means of Arrival Escorted By Service Admission Type    - 7/17/2018 16:34 Immediate Ambulance [address]  Emergency    Arrival Complaint    stroke alert          Chief Complaint:   Chief Complaint   Patient presents with    STROKE Alert     Pt fell twice today  After second time falling pt was seen altered and leaning on her L side  History of Illness: Harris Cooley is a 80 y o  female with no past medical history but does not regularly see a doctor who presents to [facility] Emergency room this evening after multiple falls at home  Patient initially fell at home and had EMS come to her house for lift assist   At that time EMS noted no abnormalities, she did not hit her head, and she is not on any blood thinning medications  EMS left the patient at her residence at approximately , which was her last known normal   Approximately 20 minutes later the patient's son showed up and noted that she again was on the floor but this time had left-sided weakness with slurred speech and altered mental status    911 was called and the patient was brought to the emergency department as a pre-hospital stroke alert  Initial CT and CTA of the patient's head and neck were unremarkable  She had an NIHS score of five and was given tPA at approximately 1720  The points she received on the NIHS score were mainly for her left upper extremity ataxia rather than weakness  Patient did have some pain in her left lower extremity, which an x-ray showed a fractured left fibula      ED Vital Signs:   ED Triage Vitals   Temperature Pulse Respirations Blood Pressure SpO2   07/17/18 1637 07/17/18 1636 07/17/18 1636 07/17/18 1636 07/17/18 1636   99 3 °F (37 4 °C) (!) 107 18 107/73 97 %      Pain Score       3       07/17/18 48 4 kg (106 lb 11 2 oz)       Vital Signs (abnormal):    07/17/18 1835  --  62  16   177/82  --   07/17/18 1826  --  57  16   176/87  97 %  RA   07/17/18 1820  --  72  18   186/86  --   07/17/18 1805  --  74  16   173/83  --   07/17/18 17:50:44  --  74  18   203/93  --   07/17/18 17:35:13  --  72  18   206/90  97 %   07/17/18 1735  --  72  18   206/90  --   07/17/18 17:20:14  --  72  18   179/85  97 %           Abnormal Labs/Diagnostic Test Results:     Tib/fib xray- Nondisplaced fracture of the distal fibula  CTH - No acute intracranial abnormality  Mild chronic small vessel ischemic changes and volume loss     CTA - No acute intracranial abnormality        ED Treatment:   Medication Administration from 07/17/2018 1630 to 07/17/2018 1957       Date/Time Order Dose Route     07/17/2018 1719 alteplase (ACTIVASE) bolus 4 6 mg 4 6 mg Intravenous     07/17/2018 1720 alteplase (ACTIVASE) infusion 41 7 mg 41 7 mg Intravenous     07/17/2018 1732 labetalol (NORMODYNE) injection 10 mg 10 mg Intravenous       Past Medical History:    Arthritis        Admitting Diagnosis: Stroke (cerebrum) (HCC) [I63 9]  Left arm weakness [R29 898]  Ataxia of left upper extremity [R27 0]  Received intravenous tissue plasminogen activator (t-PA) in emergency department [Z92 82]    Age/Sex: 80 y o  female    Assessment/Plan:     CRITICAL CARE       Neuro: suspected stroke - Patient currently without any neurologic abnormalities on exam  Will admit her under the stroke pathway and get a repeat CTH 24h post tPA to assess for any bleed, which will occur at approximately 1700 tomorrow  Will also evaluate patient via MRI to further clarify whether patient suffered a CVA or not      CV: Patient with no medical history but will allow for permissive HTN up to 160 SBP  Follow up lipid panel  FEN: Maintenance IVF at 75ml/hr NS currently  Monitor electrolytes daily with goal K>4, P>3, and Mg>2  Replete as necessary  Patient currently npo      Msk/Skin: Patient with splint in place for her distal fibula fracture  Continue to monitor for signs and symptoms of compartment syndrome especially in the setting of tPA administration  Patient with multiple ecchymoses covering her upper extremities   Borders of the ecchymoses marked and pictures taken      Admission Orders:    NEURO CHECKS Q1 HR  PT , OT AND SPEECH EVAL AND TREAT  CONSULT PHYSICAL MEDICINE AND REHABILITATION  CONSULT NEUROLOGY  CONSULT ORTHOPEDICS  SEQUENTIAL COMPRESSION DEVICE  DYSPHAGIA ASSESSMENT PRIOR TO PO INTAKE   ECHO  MRI BRAIN   LIPID PROFILE WITH LDL  HBA1C   STOOL OCCULT BLOOD X 3      Scheduled Meds:       albuterol 2 5 mg Nebulization Q6H PRN   atorvastatin 40 mg Oral QPM   nystatin  Topical BID   sodium chloride 75 mL/hr Intravenous Continuous

## 2018-07-19 LAB
ANION GAP SERPL CALCULATED.3IONS-SCNC: 6 MMOL/L (ref 4–13)
BASOPHILS # BLD AUTO: 0.03 THOUSANDS/ΜL (ref 0–0.1)
BASOPHILS NFR BLD AUTO: 0 % (ref 0–1)
BUN SERPL-MCNC: 14 MG/DL (ref 5–25)
CALCIUM SERPL-MCNC: 8.4 MG/DL (ref 8.3–10.1)
CHLORIDE SERPL-SCNC: 113 MMOL/L (ref 100–108)
CO2 SERPL-SCNC: 24 MMOL/L (ref 21–32)
CREAT SERPL-MCNC: 0.5 MG/DL (ref 0.6–1.3)
EOSINOPHIL # BLD AUTO: 0.07 THOUSAND/ΜL (ref 0–0.61)
EOSINOPHIL NFR BLD AUTO: 1 % (ref 0–6)
ERYTHROCYTE [DISTWIDTH] IN BLOOD BY AUTOMATED COUNT: 11.8 % (ref 11.6–15.1)
GFR SERPL CREATININE-BSD FRML MDRD: 89 ML/MIN/1.73SQ M
GLUCOSE SERPL-MCNC: 96 MG/DL (ref 65–140)
HCT VFR BLD AUTO: 38.1 % (ref 34.8–46.1)
HGB BLD-MCNC: 11.9 G/DL (ref 11.5–15.4)
IMM GRANULOCYTES # BLD AUTO: 0.02 THOUSAND/UL (ref 0–0.2)
IMM GRANULOCYTES NFR BLD AUTO: 0 % (ref 0–2)
LYMPHOCYTES # BLD AUTO: 0.73 THOUSANDS/ΜL (ref 0.6–4.47)
LYMPHOCYTES NFR BLD AUTO: 8 % (ref 14–44)
MCH RBC QN AUTO: 30.1 PG (ref 26.8–34.3)
MCHC RBC AUTO-ENTMCNC: 31.2 G/DL (ref 31.4–37.4)
MCV RBC AUTO: 97 FL (ref 82–98)
MONOCYTES # BLD AUTO: 1.06 THOUSAND/ΜL (ref 0.17–1.22)
MONOCYTES NFR BLD AUTO: 11 % (ref 4–12)
NEUTROPHILS # BLD AUTO: 7.38 THOUSANDS/ΜL (ref 1.85–7.62)
NEUTS SEG NFR BLD AUTO: 80 % (ref 43–75)
NRBC BLD AUTO-RTO: 0 /100 WBCS
PLATELET # BLD AUTO: 132 THOUSANDS/UL (ref 149–390)
PMV BLD AUTO: 10.5 FL (ref 8.9–12.7)
POTASSIUM SERPL-SCNC: 4.3 MMOL/L (ref 3.5–5.3)
RBC # BLD AUTO: 3.95 MILLION/UL (ref 3.81–5.12)
SODIUM SERPL-SCNC: 143 MMOL/L (ref 136–145)
WBC # BLD AUTO: 9.29 THOUSAND/UL (ref 4.31–10.16)

## 2018-07-19 PROCEDURE — 99222 1ST HOSP IP/OBS MODERATE 55: CPT | Performed by: ORTHOPAEDIC SURGERY

## 2018-07-19 PROCEDURE — 85025 COMPLETE CBC W/AUTO DIFF WBC: CPT | Performed by: INTERNAL MEDICINE

## 2018-07-19 PROCEDURE — 99232 SBSQ HOSP IP/OBS MODERATE 35: CPT | Performed by: PSYCHIATRY & NEUROLOGY

## 2018-07-19 PROCEDURE — G8979 MOBILITY GOAL STATUS: HCPCS

## 2018-07-19 PROCEDURE — 97163 PT EVAL HIGH COMPLEX 45 MIN: CPT

## 2018-07-19 PROCEDURE — 27786 TREATMENT OF ANKLE FRACTURE: CPT | Performed by: ORTHOPAEDIC SURGERY

## 2018-07-19 PROCEDURE — 99233 SBSQ HOSP IP/OBS HIGH 50: CPT | Performed by: INTERNAL MEDICINE

## 2018-07-19 PROCEDURE — 99232 SBSQ HOSP IP/OBS MODERATE 35: CPT | Performed by: PHYSICAL MEDICINE & REHABILITATION

## 2018-07-19 PROCEDURE — G8987 SELF CARE CURRENT STATUS: HCPCS

## 2018-07-19 PROCEDURE — G8988 SELF CARE GOAL STATUS: HCPCS

## 2018-07-19 PROCEDURE — G8978 MOBILITY CURRENT STATUS: HCPCS

## 2018-07-19 PROCEDURE — 92526 ORAL FUNCTION THERAPY: CPT

## 2018-07-19 PROCEDURE — 97167 OT EVAL HIGH COMPLEX 60 MIN: CPT

## 2018-07-19 PROCEDURE — 80048 BASIC METABOLIC PNL TOTAL CA: CPT | Performed by: INTERNAL MEDICINE

## 2018-07-19 RX ADMIN — ASPIRIN 81 MG: 81 TABLET, COATED ORAL at 08:25

## 2018-07-19 RX ADMIN — NYSTATIN: 100000 POWDER TOPICAL at 17:36

## 2018-07-19 RX ADMIN — ATORVASTATIN CALCIUM 40 MG: 40 TABLET, FILM COATED ORAL at 17:35

## 2018-07-19 RX ADMIN — NYSTATIN: 100000 POWDER TOPICAL at 08:25

## 2018-07-19 NOTE — PROGRESS NOTES
Progress Note - Neurology   Oleg Duran 80 y o  female [id number]  Unit/Bed#: [facility] 919/[facility] 919-01    Assessment:   Oleg Duran is a 80 y o  female who is hard of hearing with no significant PMH, medications or allergies who presented to the ED with L sided weakness, ataxia and dysarthria  NIHSS 5  MRI brain confirmed an acute right thalamic infarct  Echocardiogram with an EF of 55-60% with mild left atrial dilation  Plan:  Acute right thalamic infarct  Persistent dysarthria, left-sided facial droop, left-sided weakness and ataxia  Etiology more likely to be secondary to small vessel disease, especially as MRI reveals moderate chronic microvascular disease, though cannot rule out cardioembolic  Reasonable to have ILR placed to evaluate for PAF  Acute ischemic stroke protocol  Aspirin 81 mg  Atorvastatin 40 mg  CT head with no acute abnormality  Repeat CT head 24 hours post tPA with no acute abnormality  CTA of head and neck no large vessel occlusion or flow limiting stenosis  MRI brain without contrast Reveals a 1 cm acute infarction identified within the right posterior lateral thalamus without mass effect or hemorrhagic transformation  Echo revealing EF of 55-60% and mild left atrial dilation  Will consult cardiology for possible ILR while inpatient  Telemetry  Lipid panel: Total 133, triglycerides 61, HDL 46, LDL 75  HbA1C  5 7  UA negative  Secondary risk factor modification  Goal of normotension  Hyperglycemia control   PT/OT/ST  Stroke Education  Frequent neurological checks  Stat CT head with acute decline of in exam/mental status  Notify neurology with any changes in examination  Subjective: Oleg Duran is a 80 y o  Who is hard of hearing with no significant past medical history who presented to the ED with dysarthria, left facial droop and left-sided weakness    NIHSS 5   CT head/ CTA head and neck were negative for acute abnormality/large vessel occlusion or flow limiting stenosis  TPA was administered  Not an endovascular candidate secondary to no target lesion  Repeat CT head, 24 hours post tPA, with no acute intracranial abnormality  MRI brain confirms 1 cm acute infarction identified within the right posterior lateral thalamus without mass effect or hemorrhagic transformation  Echo completed revealing  EF of 55-60% and mild left atrial dilation  Mild mitral, aortic and tricuspid regurgitation noted  Today on exam, the patient states she is doing ok though notes that her L ankle "still hurts " Other than that she denies chest pain, shortness of breath, headache, vision changes, dizziness, and weakness or numbness of extremities  Past Medical History:   Diagnosis Date    Arthritis     scoliosis     Past Surgical History:   Procedure Laterality Date    ABDOMINAL SURGERY      bowel surgery approx  2003    JOINT REPLACEMENT Left 2015    hip     Family history:  History reviewed  No pertinent family history  Social History     Social History    Marital status: Single     Spouse name: N/A    Number of children: N/A    Years of education: N/A     Social History Main Topics    Smoking status: Never Smoker    Smokeless tobacco: Never Used    Alcohol use No    Drug use: No    Sexual activity: No     Other Topics Concern    None     Social History Narrative    None       Scheduled Meds:  Current Facility-Administered Medications:  aspirin 81 mg Oral Daily Garima Wallace MD   atorvastatin 40 mg Oral QPM Marquis Isma MD   nystatin  Topical BID Ramez Figueroa MD     Continuous Infusions:   PRN Meds:  ROS: A 12 point ROS was completed and other than the above mentioned complaints in the HPI, all remaining systems were negative         Vitals: /71 (BP Location: Right arm)   Pulse 75   Temp 97 6 °F (36 4 °C) (Axillary)   Resp 20   Ht 4' 11" (1 499 m)   Wt 51 3 kg (113 lb 1 5 oz)   SpO2 97%   BMI 22 84 kg/m²     Physical Exam:   Physical Exam Constitutional: She appears well-developed and well-nourished  No distress  Elderly female supine in bed   HENT:   Head: Normocephalic and atraumatic  Poor dentition   Eyes: Conjunctivae and EOM are normal  Pupils are equal, round, and reactive to light  Right eye exhibits no discharge  Left eye exhibits no discharge  No scleral icterus  Neck: Normal range of motion  Neck supple  Cardiovascular: Normal rate and regular rhythm  Exam reveals no gallop and no friction rub  No murmur heard  Pulmonary/Chest: Effort normal and breath sounds normal  No stridor  No respiratory distress  She has no wheezes  She has no rales  She exhibits no tenderness  Musculoskeletal: She exhibits no edema, tenderness or deformity  Lymphadenopathy:     She has no cervical adenopathy  Neurological: She is alert  Skin: Skin is warm and dry  No rash noted  No erythema  Significant confluent LUE ecchymosis with minimal areas of R dorsal forearm ecchymosis   Psychiatric:   cooperative     Neurologic Exam     Mental Status   Follows 1 step commands  Level of consciousness: alert  Oriented to person and July, but states that we are in her apartment and that the year is 2088  She cannot recall the president's name  Decreased attention noted during exam       Cranial Nerves     CN II   Visual acuity: normal (grossly)    CN III, IV, VI   Pupils are equal, round, and reactive to light  Extraocular motions are normal    Conjugate gaze: present    CN V   Facial sensation intact  CN VIII   Hearing: impaired (chronically)  Difficulty following commands to track with bilateral eyes  Minimal L facial droop  Speech notable for dysarthria       Motor Exam   Muscle bulk: decreased  Overall muscle tone: normal  Right arm pronator drift: absent  Left arm pronator drift: present  RUE/RLE: 5  LUE:   Unable to maintain antigravity position drifting outward to bed   strength 4+  LLE: Unable to lift antigravity; however, patient with ankle immobilizer in place due to recent ankle fracture       Sensory Exam   Light touch normal      Gait, Coordination, and Reflexes     Reflexes   Right plantar: normal  Ataxia on the LUE  Unable to test plantar reflex on L due to ankle immobilizer        Labs:  Recent Results (from the past 24 hour(s))   Basic metabolic panel    Collection Time: 07/19/18  4:49 AM   Result Value Ref Range    Sodium 143 136 - 145 mmol/L    Potassium 4 3 3 5 - 5 3 mmol/L    Chloride 113 (H) 100 - 108 mmol/L    CO2 24 21 - 32 mmol/L    Anion Gap 6 4 - 13 mmol/L    BUN 14 5 - 25 mg/dL    Creatinine 0 50 (L) 0 60 - 1 30 mg/dL    Glucose 96 65 - 140 mg/dL    Calcium 8 4 8 3 - 10 1 mg/dL    eGFR 89 ml/min/1 73sq m   CBC and differential    Collection Time: 07/19/18  4:49 AM   Result Value Ref Range    WBC 9 29 4 31 - 10 16 Thousand/uL    RBC 3 95 3 81 - 5 12 Million/uL    Hemoglobin 11 9 11 5 - 15 4 g/dL    Hematocrit 38 1 34 8 - 46 1 %    MCV 97 82 - 98 fL    MCH 30 1 26 8 - 34 3 pg    MCHC 31 2 (L) 31 4 - 37 4 g/dL    RDW 11 8 11 6 - 15 1 %    MPV 10 5 8 9 - 12 7 fL    Platelets 669 (L) 054 - 390 Thousands/uL    nRBC 0 /100 WBCs    Neutrophils Relative 80 (H) 43 - 75 %    Immat GRANS % 0 0 - 2 %    Lymphocytes Relative 8 (L) 14 - 44 %    Monocytes Relative 11 4 - 12 %    Eosinophils Relative 1 0 - 6 %    Basophils Relative 0 0 - 1 %    Neutrophils Absolute 7 38 1 85 - 7 62 Thousands/µL    Immature Grans Absolute 0 02 0 00 - 0 20 Thousand/uL    Lymphocytes Absolute 0 73 0 60 - 4 47 Thousands/µL    Monocytes Absolute 1 06 0 17 - 1 22 Thousand/µL    Eosinophils Absolute 0 07 0 00 - 0 61 Thousand/µL    Basophils Absolute 0 03 0 00 - 0 10 Thousands/µL       Imaging: I have personally reviewed pertinent imaging and PACS reports  VTE Prophylaxis: Sequential compression device (Venodyne)     Total time spent today 15 minutes   Greater than 50% of total time was spent with the patient and / or family counseling and / or coordination of care  A description of the counseling / coordination of care: discussing current symptoms, MRI findings and plan of care

## 2018-07-19 NOTE — CONSULTS
Consult - Mana Kyle 80 y o  female MRN: [id number]    Unit/Bed#: [facility] 919-01 Encounter: [id number]            History of Present Illness   Patient is 81 yo female with L sided weakness, dysarthria, ataxia and fall with L fibular fx; found to have Rt thalamic infarct           PMHx:  None documented       Family History: Non contributory    ROS:  Constitutional: denies fevers, chills  HEENT: denies tinnitus  Pulm: denies SOB  CV: denies CP  GI: denies abdominal pain  Neuro: denies headache  Skin: denies rashes  Psych: denies depression  Extremities: denies edema    No Known Allergies      Funtional status current: mod-max ADLs  Functional status prior to admission: I PTA   Social history: lives alone in [facility], [address] [facility]    Objective     Current Vitals:   Blood Pressure: 148/71 (07/19/18 0727)  Pulse: 75 (07/19/18 0727)  Temperature: 97 6 °F (36 4 °C) (07/19/18 0727)  Temp Source: Axillary (07/19/18 0727)  Respirations: 20 (07/19/18 0727)  Height: 4' 11" (149 9 cm) (07/19/18 0100)  Weight - Scale: 51 3 kg (113 lb 1 5 oz) (07/19/18 0100)  SpO2: 97 % (07/19/18 0727)    Gen: NAD   HEENT: No swelling of the tongue  Pulm: respirations unlabored  Card: +S1/S2   Abd: soft NT  Neuro: +dysarthria, awake, alert, follows commands  MSK: 4-/5 UE B/L, 4-/5 RLE  Extrem: + CAM boot LLE   Skin: no rashes  Psych: mood/affect stable               Comorbidities:   L fibular fx     Assessment:  Patient is 81 yo female with L sided weakness, dysarthria, ataxia and fall with L fibular fx; found to have Rt thalamic infarct    Plan:    CVA: ASA, statin  L fibular fx: non-op management per ortho, CAM walker, wbat    Awaiting PT eval prior to making recs, recommend cognitive screen (ACLS) by OT however will likely need supervision upon dc however patient with limited support

## 2018-07-19 NOTE — ASSESSMENT & PLAN NOTE
Patient has left sided weakness, dysarthria and ataxia with NIH scale of 5, fall with fracture  S/p TPA  neuro checks q 2 hrs  Stroke pathway  Neurology following

## 2018-07-19 NOTE — PROGRESS NOTES
Progress Note - Orthopedics   Fady Worthy 80 y o  female MRN: [id number]  [facility]      Subjective:    80 y  o female admitted for CVA with left ankle fracture  No acute events overnight, limited HPI due to altered mental status  Labs:    0  Lab Value Date/Time   HCT 38 1 07/19/2018 0449   HCT 44 2 07/17/2018 1644   HCT 28 9 (L) 12/07/2015 0500   HCT 35 1 12/06/2015 0545   HCT 46 7 (H) 12/05/2015 0600   HGB 11 9 07/19/2018 0449   HGB 14 1 07/17/2018 1644   HGB 14 3 07/17/2018 1641   HGB 9 5 (L) 12/07/2015 0500   HGB 11 5 12/06/2015 0545   HGB 15 7 (H) 12/05/2015 0600   INR 1 00 07/17/2018 1644   INR 1 07 12/04/2015 1745   WBC 9 29 07/19/2018 0449   WBC 5 86 07/17/2018 1644   WBC 6 57 12/07/2015 0500   WBC 7 32 12/06/2015 0545   WBC 8 72 12/05/2015 0600       Meds:    Current Facility-Administered Medications:     aspirin (ECOTRIN LOW STRENGTH) EC tablet 81 mg, 81 mg, Oral, Daily, Rudy Deluna MD    atorvastatin (LIPITOR) tablet 40 mg, 40 mg, Oral, QPM, Karen Giang MD, 40 mg at 07/18/18 1933    nystatin (MYCOSTATIN) powder, , Topical, BID, Bekah Riddle MD    Blood Culture:   No results found for: BLOODCX    Wound Culture:   No results found for: WOUNDCULT    Ins and Outs:  I/O last 24 hours: In: 2365 [P O :350; I V :2015]  Out: 2050 [Urine:2050]          Physical:  Vitals:    07/19/18 0100   BP: 164/80   Pulse: 70   Resp: 20   Temp: 98 4 °F (36 9 °C)   SpO2: 99%     Musculoskeletal: left Lower Extremity  ·     · Skin no erythema, laceration, mild swelling  ·   · TTP lateral malleolus   · SILT s/s/sp/dp/t  +fhl/ehl, +ankle dorsi/plantar flexion  +PT pulse    _*_*_*_*_*_*_*_*_*_*_*_*_*_*_*_*_*_*_*_*_*_*_*_*_*_*_*_*_*_*_*_*_*_*_*_*_*_*_*_*_*    Assessment:    80 y  o female lateral malleolar fracture, stable    Plan:  WBAT in CAM walker boot   · PT/OT  · Pain control  · DVT ppx  · Dispo: Ortho signing off   · Follow up as out patient in 2-4 weeks    Jess Wilson PA-C

## 2018-07-19 NOTE — PLAN OF CARE
Problem: PHYSICAL THERAPY ADULT  Goal: Performs mobility at highest level of function for planned discharge setting  See evaluation for individualized goals  Treatment/Interventions: Functional transfer training, LE strengthening/ROM, Therapeutic exercise, Endurance training, Cognitive reorientation, Patient/family training, Equipment eval/education, Compensatory technique education, Spoke to nursing, Spoke to case management, Spoke to advanced practitioner, OT  Equipment Recommended: Other (Comment) (TBD)       See flowsheet documentation for full assessment, interventions and recommendations  Prognosis: Fair  Problem List: Decreased strength, Decreased range of motion, Decreased endurance, Impaired balance, Decreased mobility, Decreased coordination, Decreased cognition, Decreased safety awareness, Impaired sensation, Impaired tone, Decreased skin integrity, Orthopedic restrictions, Pain  Assessment: Pt is an 80year old female presenting following multiple falls at home, L side weakness, slurred speech and AMS  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, L arm weakness, ataxia of LUE, closed fracture of distal end of L fibula and ecchymosis  MRI performed and indicated patient with  "1cm acute infarction identified within the right posterior lateral thalamus  No mass effect or hemorrhagic transformation"  Pt did receive tPa during stay  Pt with an additional problem list/PMH which includes falls, arthritis,scoliosis, and h/o TJR  PT consulted to assess functional mobility and assist with safe d/c planning  PT eval performed with WBAT LLE in CAM walker  PTA, pt living at home alone in a 1 level apartment  Pt reports using a Rollator for mobility and denies falls prior to this episode  On eval, pt presenting with the following deficits: impaired static and dynamic balance, L side inattention, L sided hemiparesis, LE buckling, decreased activity tolerance and decreased overall functional mobility   Pt required varied levels of assistance for activity including moderate Ax1 for bed mobility, max Ax1-2 for sit<->stand and total A for stand pivot transfer OOB to chair  Ambulation/OOB transfer limited 2* anxiety making assessment difficult  Additional factors limiting mobility include scoliosis, KLE weakness and L sided inattention  Pt below baseline and unable to safely return home alone at this time  Will continue to follow pt during stay to progress functional mobility (I) and safety  Rehab required at this time top maximize function and reduce caregiver burden  Barriers to Discharge: Inaccessible home environment, Decreased caregiver support  Barriers to Discharge Comments: lives alone, EMMA  Recommendation: Post acute IP rehab     PT - OK to Discharge: Yes (to rehab when medically appropriate )    See flowsheet documentation for full assessment

## 2018-07-19 NOTE — PROGRESS NOTES
Progress Note - Isaura Perez 1934, 80 y o  female MRN: [id number]    [facility] Encounter: [id number]    Primary Care Provider: Belen Gray MD   Date and time admitted to hospital: 7/17/2018  4:35 PM        Closed fracture of distal end of left fibula   Assessment & Plan    Orthopedics saw patient  Pain management  She needs to follow up with orthopedics in 2-4 weeks  A/c is contra indicated due to TPA now, would need neurology recommendations as to when to start a/c        Ataxia   Assessment & Plan    Possibly related to new stroke   S/p TPA  Need ot/pt evaluation and treatment  Fall   Assessment & Plan    Possibly related to stroke, ataxia and dysarthria  Would need PT/OT evaluation and treatment  Possible rehab inpatient vs  Outpatient  * Acute CVA (cerebrovascular accident) [facility])   Assessment & Plan    Patient has left sided weakness, dysarthria and ataxia with NIH scale of 5, fall with fracture  S/p TPA  neuro checks q 2 hrs  Stroke pathway  Neurology following                VTE Pharmacologic Prophylaxis:   Pharmacologic: Pharmacologic VTE Prophylaxis contraindicated due to due to stroke  Mechanical VTE Prophylaxis in Place: Yes    Patient Centered Rounds: I have performed bedside rounds with nursing staff today  Discussions with Specialists or Other Care Team Provider:      Education and Discussions with Family / Patient: patient, family     Time Spent for Care: 45 minutes  More than 50% of total time spent on counseling and coordination of care as described above  Current Length of Stay: 2 day(s)    Current Patient Status: Inpatient   Certification Statement: The patient will continue to require additional inpatient hospital stay due to CVA and fracture with pain    Discharge Plan: need stroke work up and move to  when bed available    Code Status: Level 1 - Full Code      Subjective:   I am doing well  A lot of pain in my right leg      Objective: Vitals:   Temp (24hrs), Av 2 °F (36 8 °C), Min:97 6 °F (36 4 °C), Max:98 7 °F (37 1 °C)    HR:  [66-76] 75  Resp:  [20-41] 20  BP: (118-164)/(59-80) 148/71  SpO2:  [93 %-99 %] 97 %  Body mass index is 22 84 kg/m²  Input and Output Summary (last 24 hours): Intake/Output Summary (Last 24 hours) at 18 1029  Last data filed at 18 0914   Gross per 24 hour   Intake             1190 ml   Output             1150 ml   Net               40 ml       Physical Exam:     Physical Exam    Additional Data:     Labs:      Results from last 7 days  Lab Units 18  0449   WBC Thousand/uL 9 29   HEMOGLOBIN g/dL 11 9   HEMATOCRIT % 38 1   PLATELETS Thousands/uL 132*   NEUTROS PCT % 80*   LYMPHS PCT % 8*   MONOS PCT % 11   EOS PCT % 1       Results from last 7 days  Lab Units 18  0449 18  1644   SODIUM mmol/L 143 144   POTASSIUM mmol/L 4 3 4 1   CHLORIDE mmol/L 113* 109*   CO2 mmol/L 24 30   BUN mg/dL 14 21   CREATININE mg/dL 0 50* 0 75   CALCIUM mg/dL 8 4 8 9   TOTAL PROTEIN g/dL  --  6 9   BILIRUBIN TOTAL mg/dL  --  0 45   ALK PHOS U/L  --  68   ALT U/L  --  22   AST U/L  --  27   GLUCOSE RANDOM mg/dL 96 149*       Results from last 7 days  Lab Units 18  1644   INR  1 00       * I Have Reviewed All Lab Data Listed Above  * Additional Pertinent Lab Tests Reviewed: [facility] Admission Reviewed    Imaging:    Imaging Reports Reviewed Today Include: Xray of hip  Imaging Personally Reviewed by Myself Includes:  CT brain    Recent Cultures (last 7 days):           Last 24 Hours Medication List:     Current Facility-Administered Medications:  aspirin 81 mg Oral Daily Claire Sanabria MD   atorvastatin 40 mg Oral QPM Shanita Cunningham MD   nystatin  Topical BID Monica Whitten MD        Today, Patient Was Seen By: Don Edmond MD    ** Please Note: Dictation voice to text software may have been used in the creation of this document   **

## 2018-07-19 NOTE — PHYSICAL THERAPY NOTE
Physical Therapy Evaluation     Patient's Name: Benny Alcocer    Admitting Diagnosis  Stroke (cerebrum) ([facility] [address] ) [I63 9]  Left arm weakness [R29 898]  Ataxia of left upper extremity [R27 0]  Received intravenous tissue plasminogen activator (t-PA) in emergency department [Z92 82]    Problem List  Patient Active Problem List   Diagnosis    [facility])    Fall    Ataxia    Closed fracture of distal end of left fibula    Ecchymosis       Past Medical History  Past Medical History:   Diagnosis Date    Arthritis     scoliosis       Past Surgical History  Past Surgical History:   Procedure Laterality Date    ABDOMINAL SURGERY      bowel surgery approx  2003    JOINT REPLACEMENT Left 2015    hip      07/19/18 0915   Note Type   Note type Eval/Treat   Pain Assessment   Pain Assessment FLACC   Pain Rating: FLACC (Rest) - Face 0   Pain Rating: FLACC (Rest) - Legs 0   Pain Rating: FLACC (Rest) - Activity 0   Pain Rating: FLACC (Rest) - Cry 0   Pain Rating: FLACC (Rest) - Consolability 0   Score: FLACC (Rest) 0   Pain Rating: FLACC (Activity) - Face 1   Pain Rating: FLACC (Activity) - Legs 1   Pain Rating: FLACC (Activity) - Activity 1   Pain Rating: FLACC (Activity) - Cry 1   Pain Rating: FLACC (Activity) - Consolability 1   Score: FLACC (Activity) 5   Home Living   Type of Home Apartment   Home Layout One level  (appx 4 EMMA )   Bathroom Shower/Tub Tub/shower unit   Bathroom Toilet Standard   Bathroom Equipment Grab bars in shower; Shower chair   [address] Walker;Life alert  (Rolator )   Additional Comments Son and HHA assists with (I)ADLs    Prior Function   Level of [place] Independent with ADLs and functional mobility; Needs assistance with IADLs   Lives With Alone   Receives Help From Family;Home health   ADL Assistance Independent   IADLs Needs assistance   Falls in the last 6 months 1 to 4  (2)   Vocational Retired   Comments Patient's son assists with needs PRN    Restrictions/Precautions   Weight Bearing Precautions Per Order Yes   LLE Weight Bearing Per Order WBAT   Braces or Orthoses CAM Boot  (LLE )   Other Precautions Cognitive; Chair Alarm; Bed Alarm;WBS;Fall Risk;Pain   General   Family/Caregiver Present No   Cognition   Overall Cognitive Status Impaired   Arousal/Participation Other (Comment)  (ANXIOUS )   Attention Difficulty attending to directions   Orientation Level Oriented to person;Disoriented to place; Disoriented to time;Disoriented to situation   Memory Decreased short term memory   Following Commands Follows one step commands inconsistently   Comments Patient able to identify name and   Requires increased time for all activity with constant redirection required  Pt highly anxious and requires reassurance frequently    RUE Assessment   RUE Assessment WFL   LUE Assessment   LUE Assessment X   RLE Assessment   RLE Assessment WFL   LLE Assessment   LLE Assessment X   Strength LLE   L Hip Flexion 2+/5   L Hip ABduction 2+/5   L Hip ADduction 2+/5   L Knee Flexion 2-/5   L Ankle Dorsiflexion (NT- WBAT in CAM boot )   L Knee Extension (NT- WBAT in CAM boot )   L Ankle Plantar Flexion (NT- WBAT in CAM boot )   Coordination   Movements are Fluid and Coordinated 0   Coordination and Movement Description BLE buckling; L sided inattention; hemiparesis   Sensation X   Light Touch   RLE Light Touch Grossly intact   LLE Light Touch Impaired   Proprioception   RLE Proprioception Grossly intact   LLE Proprioception Impaired   Bed Mobility   Supine to Sit 3  Moderate assistance   Additional items Assist x 1; Increased time required;Verbal cues;LE management   Additional Comments Pt able to transfer EOB with assistance  During EOB sitting, pt mostly required CS with occasional need of Ivonne 2* fatigue  Pt highly anxious to transfer OOB    Transfers   Sit to Stand 2  Maximal assistance   Additional items Assist x 2; Increased time required;Verbal cues   Stand to Sit 2  Maximal assistance   Additional items Assist x 2; Increased time required;Verbal cues   Sit pivot 2  Maximal assistance   Additional items Assist x 2; Increased time required;Verbal cues   Additional Comments Pt resisting motion at times making OOB transfer difficult  Pt reaching to grab for support    Balance   Static Sitting Poor   Dynamic Sitting Poor -   Static Standing Zero   Endurance Deficit   Endurance Deficit Yes   Endurance Deficit Description fatigue, pain, ongoing deconditioning, anxiety    Activity Tolerance   Activity Tolerance Patient limited by fatigue;Patient limited by pain   Medical Staff [facility],    Nurse Made Aware appropriate to see per RN; notified PCA of appropriate return to bed transfer    Assessment   Prognosis Fair   Problem List Decreased strength;Decreased range of motion;Decreased endurance; Impaired balance;Decreased mobility; Decreased coordination;Decreased cognition;Decreased safety awareness; Impaired sensation; Impaired tone;Decreased skin integrity;Orthopedic restrictions;Pain   Assessment Pt is an 80year old female presenting following multiple falls at home, L side weakness, slurred speech and AMS  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, L arm weakness, ataxia of LUE, closed fracture of distal end of L fibula and ecchymosis  MRI performed and indicated patient with  "1cm acute infarction identified within the right posterior lateral thalamus  No mass effect or hemorrhagic transformation"  Pt did receive tPa during stay  Pt with an additional problem list/PMH which includes falls, arthritis,scoliosis, and h/o TJR  PT consulted to assess functional mobility and assist with safe d/c planning  PT eval performed with WBAT LLE in CAM walker  PTA, pt living at home alone in a 1 level apartment  Pt reports using a Rollator for mobility and denies falls prior to this episode   On eval, pt presenting with the following deficits: impaired static and dynamic balance, L side inattention, L sided hemiparesis, LE buckling, decreased activity tolerance and decreased overall functional mobility  Pt required varied levels of assistance for activity including moderate Ax1 for bed mobility, max Ax1-2 for sit<->stand and total A for stand pivot transfer OOB to chair  Ambulation/OOB transfer limited 2* anxiety making assessment difficult  Additional factors limiting mobility include scoliosis, KLE weakness and L sided inattention  Pt below baseline and unable to safely return home alone at this time  Will continue to follow pt during stay to progress functional mobility (I) and safety  Rehab required at this time top maximize function and reduce caregiver burden  Barriers to Discharge Inaccessible home environment;Decreased caregiver support   Barriers to Discharge Comments lives alone, EMMA   Goals   Patient Goals Patient would like to regain her (I) with tasks at home   STG Expiration Date 08/02/18   Short Term Goal #1 Pt will be S  with rolling R and L for ease with OOB transfer  Pt will be S with supine<->sit transfer to facilitate OOB  Pt will sit EOB > 10 minutyes and require DS for static and dynamic tasks  Pt will be Ivonne with sit<->stand to promote (I) with toileting  Pt will stand with appropriate UE support > 3 minutes with Ivonne  Pt will be Ivonne with stand pivot transfers OOB<->surface  Pt will participate in pre gait activities with appropriate UE support  t will particiapte in HEP consisitng of balance, strength, ROM and coordinaiton tasks to increase activitiy, improve (I) and decrease pain  Treatment Day 0   Plan   Treatment/Interventions Functional transfer training;LE strengthening/ROM; Therapeutic exercise; Endurance training;Cognitive reorientation;Patient/family training;Equipment eval/education; Compensatory technique education;Spoke to nursing;Spoke to case management;Spoke to advanced practitioner;OT   PT Frequency Other (Comment)  (3-6x/wk)   Recommendation Recommendation Post acute IP rehab   Equipment Recommended Other (Comment)  (TBD)   PT - OK to Discharge Yes  (to rehab when medically appropriate )   Additional Comments OOB in chair with alarm activated and all needs within reach    Barthel Index   Feeding 5   Bathing 0   Grooming Score 0   Dressing Score 0   Bladder Score 10   Bowels Score 10   Toilet Use Score 5   Transfers (Bed/Chair) Score 5   Mobility (Level Surface) Score 0   Stairs Score 0   Barthel Index Score 35             Cony Thomas, PT

## 2018-07-19 NOTE — ASSESSMENT & PLAN NOTE
Possibly related to stroke, ataxia and dysarthria  Would need PT/OT evaluation and treatment  Possible rehab inpatient vs  Outpatient

## 2018-07-19 NOTE — ASSESSMENT & PLAN NOTE
Orthopedics saw patient  Pain management  She needs to follow up with orthopedics in 2-4 weeks  A/c is contra indicated due to TPA now, would need neurology recommendations as to when to start a/c

## 2018-07-19 NOTE — PLAN OF CARE
Problem: OCCUPATIONAL THERAPY ADULT  Goal: Performs self-care activities at highest level of function for planned discharge setting  See evaluation for individualized goals  Treatment Interventions: ADL retraining, Functional transfer training, UE strengthening/ROM, Endurance training, Cognitive reorientation, Patient/family training, Equipment evaluation/education, Fine motor coordination activities, Compensatory technique education  Equipment Recommended: Bedside commode       See flowsheet documentation for full assessment, interventions and recommendations  Limitation: Decreased ADL status, Decreased UE ROM, Decreased UE strength, Decreased Safe judgement during ADL, Decreased cognition, Decreased endurance, Decreased fine motor control, Decreased self-care trans, Decreased high-level ADLs, Non-func L UE     Assessment: Pt is an 80 yof [facility] for an acute CVA  Pt had fallen twice and had an altered mental status and was leaning on her L side  Pt presents w/ the following PMH impacting occupational performance: arthritis and scoliosis  Pt reports living in an apartment w 4 EMMA and a rail PTA, and used a rollator for functional mobility  Pt reports I w/ ADLs and receives assistance w/ IADLs from her son and home aide  Upon evaluation, pt was alert and oriented x2  Pt able to attend to person and knew she was in a hospital  As the eval progress pt demonstrated decreased recall of OTS that was present in the room for 30 mins  Pt had questionable recall of recent events but reported number of kids and grand-kids  Pt required min A X 1 for bed mobility for LE management  While seated at EOB pt had poor balance and required max VC to maintain midline position  Max A x 2 for sit pivot bed > chair transfer and pt was anxious during the transfer and verbalized she was nervous  Pt also benefited from max VC for hand placement, and body mechanics during transfer   Pt presents w/ decreased endurance, standing tolerance, L UE  ROM and strength and cognition  impacting I w/ functional mobility, functional standing tolerance, functional transfers, UB/LB dressing, and bed mobility  While in acute care pt would benefit from OT services and cognitive screen to address deficit areas  From an OT perspective, pt would benefit from post acute rehab when medically stable for discharge from acute care to return to PLOF   Will continue to follow     OT Discharge Recommendation: Short Term Rehab  OT - OK to Discharge: No

## 2018-07-19 NOTE — DISCHARGE INSTRUCTIONS
Discharge Instructions - Orthopedics  Benny Alcocer 80 y o  female MRN: [id number]  Unit/Bed#: [facility] 919-01    Weight Bearing Status:                                           Weight bearing as tolerated left lower extremity    DVT prophylaxis:  Per primary team    Pain:  Continue analgesics as directed    Dressing Instructions:   Keep CAM boot on until follow up appointment  PT/OT:  Continue PT/OT on outpatient basis as directed    Appt Instructions: If you do not have your appointment, please call the clinic at [phone] to f/u with Dr George Chanel in 2 weeks      Contact the office sooner if you experience any increased numbness/tingling in the extremities  Miscellaneous:  None      Keep loop recorder incision dry for one week  Do not use lotions/powders/creams on incision  Remove outer bandage 48 hours after procedure - if present, leave underlying steri-strips in place, they will either fall off on their own or will be removed at 2 week follow up appointment  Please call the office [phone] if you notice redness, swelling, bleeding, or drainage from incision or if you develop fevers  Cardiac Loop Recorder Insertion      WHAT YOU SHOULD KNOW:    A cardiac loop recorder is a device used to diagnose heart rhythm problems, such as a fast or irregular heartbeat  It is implanted in your left chest, just under the skin  The device records a pattern of your heart's rhythm, called an EKG  Your device records automatic EKGs, depending on how your caregiver programs it  You may also receive a handheld controller  You press a button on the controller when you have symptoms, such as dizziness, lightheadedness, or palpitations  The device will record an EKG at that moment  The recording can help your caregiver see if your symptoms may be caused by heart rhythm problems  Your caregiver will remove the device after it has collected enough data  You may need the device for up to 3 years   The procedure to remove the device is similar to the procedure used to implant it       AFTER YOU LEAVE:    Follow up with your cardiologist as directed: You will need to return in 1 to 2 weeks  Your cardiologist will check your incision  He may also program your device settings again  He will retrieve data from the device every 1 to 3 months with a monitor held over your skin  You may be able to transmit data from your device from home as well  You will do this by calling a number provided by your cardiologist, or as they have instructed you  Ask for information about this process  Write down your questions so you remember to ask them during your visits       Wound care: Keep loop recorder incision dry for one week  Do not use lotions/powders/creams on incision  Remove outer bandage 48 hours after procedure - leave underlying steri-strips in place, they will either fall off on their own or will be removed at 2 week follow up appointment  Please call the office if you notice redness, swelling, bleeding, or drainage from incision or if you develop fevers  After that first week, carefully wash your incision with soap and water  Keep the area clean and dry until it heals       Return to activity: If you received anesthesia, you will not be able to drive for 24 hours  Otherwise, most people can return to normal activities soon after the procedure  Your cardiologist may want to know if your work involves electrical current or high-voltage equipment  Ask about other electrical items that could interfere with your cardiac loop recorder       Contact your cardiologist if:   · You have a fever or chills  · Your wound is red, swollen, or draining pus  · You have questions or concerns about your condition or care  Seek care immediately or call 911 if:   · You feel weak, dizzy, or faint  · You lose consciousness        © 2014 [address] [employer] is for End User's use only and may not be sold, redistributed or otherwise used for commercial purposes  All illustrations and images included in CareNotes® are the copyrighted property of A [employer]  or [employer] Angel [employer]ikh  The above information is an  only  It is not intended as medical advice for individual conditions or treatments  Talk to your doctor, nurse or pharmacist before following any medical regimen to see if it is safe and effective for you

## 2018-07-19 NOTE — OCCUPATIONAL THERAPY NOTE
[address]  Evaluation     Patient Name: Benny Alcocer  DXTHW'B Date: 7/19/2018  Problem List  Patient Active Problem List   Diagnosis    [facility])    Fall    Ataxia    Closed fracture of distal end of left fibula    Ecchymosis     Past Medical History  Past Medical History:   Diagnosis Date    Arthritis     scoliosis     Past Surgical History  Past Surgical History:   Procedure Laterality Date    ABDOMINAL SURGERY      bowel surgery approx  2003    JOINT REPLACEMENT Left 2015    hip        07/19/18 0914   Note Type   Note type Eval/Treat   Restrictions/Precautions   Weight Bearing Precautions Per Order Yes   LLE Weight Bearing Per Order WBAT   Braces or Orthoses CAM Boot   Other Precautions Cognitive; Fall Risk;WBS;Chair Alarm   Pain Assessment   Pain Assessment No/denies pain   Pain Score No Pain   Home Living   Type of Home Apartment   Home Layout One level  (~4 EMMA)   Bathroom Shower/Tub Tub/shower unit   Bathroom Toilet Standard   Bathroom Equipment Grab bars in shower; Shower chair   [address] Walker;Life alert  (Rollator)   Additional Comments Pt lives alone in apartment at baseline  Son and homeaid assist pt w/ IADLs  Pt does not drive at baseline  Prior Function   Level of [place] Needs assistance with IADLs; Independent with ADLs and functional mobility  (Pt uses rollator at baseline for functional mobility)   Lives With Alone   Receives Help From Family; Other (Comment)  (Home aide)   ADL Assistance Independent   IADLs Needs assistance   Falls in the last 6 months 1 to 4  (pt reports 2)   Vocational Retired   Comments At baseline pt is I w/ ADLs and receives assistance w/ IADLs from son and home aide  Pt stated, "my son does whatever I can't do"   Lifestyle   Autonomy Pt is I w/ ADLs at baseline and lives alone in her apartment      Reciprocal Relationships Pt has 3 children but alluded to receiving help from one son and home aide, regularly  Service to Others 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  Intrinsic Gratification Pt reported to enjoy cleaning   Psychosocial   Psychosocial (WDL) X   Patient Behaviors/Mood Anxious  (Pt reported to become anxious upon transfers )   Needs Expressed Other (Comment)  (Pt reported she was hungry during the session )   Ability to Express Feelings Able to express   Ability to Express Needs Able to express   Ability to Express Thoughts Able to express   Ability to Understand Others Sometimes understands   Subjective   Subjective "I get nervous when I need to move"   ADL   Where Assessed Edge of bed   Eating Assistance 6  Modified independent   Eating Deficit Setup;Supervision/safety  (Pt was able to eat breakfast 2' to table set-up )   Grooming Assistance 3  Moderate Assistance   Grooming Deficit Setup;Brushing hair; Increased time to complete;Supervision/safety;Steadying   UB Bathing Assistance 3  Moderate Assistance   UB Bathing Deficit Setup;Supervision/safety; Increased time to complete; Other (Comment)  (Pt demonstrate decreased B ROM and limited use of L arm)   LB Bathing Assistance 2  Maximal Assistance   LB Bathing Deficit Setup;Steadying;Supervision/safety; Increased time to complete;Right upper leg;Left upper leg;Right lower leg including foot; Left lower leg including foot   UB Dressing Assistance 3  Moderate Assistance   UB Dressing Deficit Setup;Steadying;Verbal cueing; Increased time to complete; Thread LUE  (Decreased B ROM and non-use of L UE)   LB Dressing Assistance 2  Maximal Assistance   LB Dressing Deficit Setup;Steadying;Verbal cueing;Supervision/safety; Increased time to complete;Pull up over hips   Toileting Assistance  2  Maximal Assistance   Toileting Deficit Steadying;Verbal cueing; Increased time to complete;Grab bar use;Clothing management up;Clothing management down;Perineal hygiene   Functional Assistance 3  Moderate Assistance   Functional Deficit Supervision/safety; Increased time to complete   Additional Comments Pt is able to particpate in ADLs but will require mod to max A to complete ADLs  Bed Mobility   Supine to Sit 4  Minimal assistance   Additional items Assist x 1;HOB elevated; Increased time required;LE management;Verbal cues   Sit to Supine 4  Minimal assistance   Additional items Assist x 1;HOB elevated; Increased time required;LE management;Verbal cues   Additional Comments Pt was in bed upon eval, w/ HOB elavated and required min A primarily for LE management   Transfers   Sit to Stand 1  Dependent   Additional items Assist x 2; Increased time required;Verbal cues   Stand to Sit 1  Dependent   Additional items Assist x 2; Increased time required;Verbal cues   Sit pivot 2  Maximal assistance   Additional items Assist x 2; Increased time required;Verbal cues; Other  (Pt became very anxious)   Functional Mobility   Functional Mobility 1  Total assistance   Additional Comments Pt is very anxious and nervous when moving  Pt reported "I do not want to fall"   Additional items Rolling walker   Balance   Static Sitting Poor   Dynamic Sitting Poor -   Static Standing Zero   Activity Tolerance   Activity Tolerance Patient limited by pain; Other (Comment)  (Limited 2' to anxiety)   Medical Staff Made Aware Walt List PT, Mohit Medeiros OT   Nurse Made Aware Spoke w/ RN   RUE Assessment   RUE Assessment [facility]   RU Strength   RUE Overall Strength Within Functional Limits - able to perform ADL tasks with strength   LUE Assessment   LUE Assessment X   LUE Strength   LUE Overall Strength Deficits  (L UE presents w/ deficits: decreased ROM, strength)   L Shoulder Flexion 2+/5   Edema   LUE Edema (Not able to complete full range against gravity )   Hand Function   Gross Motor Coordination Functional  (On the R, Impaired on L)   Fine Motor Coordination Functional  (On the R, Impaired on L)   Sensation   Light Touch Partial deficits in the LUE   Additional Comments Pt has weak  on L UE but is sensate  Proprioception   Proprioception Partial deficits in the LUE  (Pt able to identify L UE and bring to lap and hold L hand )   Vision-Basic Assessment   Current Vision Other (Comment)  (Able to eat food place in front of her)   Perception   Inattention/Neglect Cues to attend to left side of body;Cues to maintain midline in sitting   Perseveration Perseverates during conversation   Cognition   Overall Cognitive Status Impaired   Arousal/Participation Alert; Cooperative   Attention Difficulty attending to directions   Orientation Level Disoriented to person;Disoriented to time;Oriented to place   Memory Decreased short term memory   Following Commands Follows one step commands inconsistently   Comments Pt identified by  and full name  Pt able to engage in conversation but benefits from cues to redirect  Pt has decreased recall  During eval, pt had decreased recall of OTS that had been in room for 30 mins  Pt was able to recall number of children and grandchildren   Assessment   Limitation Decreased ADL status; Decreased UE ROM; Decreased UE strength;Decreased Safe judgement during ADL;Decreased cognition;Decreased endurance;Decreased fine motor control;Decreased self-care trans;Decreased high-level ADLs; Non-func L UE   Assessment Pt is an 80 yof [facility] to [facility] on [facility] for an acute CVA  Pt had fallen twice and had an altered mental status and was leaning on her L side  Pt presents w/ the following PMH impacting occupational performance: arthritis and scoliosis  Pt reports living in an apartment w 4 EMMA and a rail PTA, and used a rollator for functional mobility  Pt reports I w/ ADLs and receives assistance w/ IADLs from her son and home aide  Upon evaluation, pt was alert and oriented x2  Pt able to attend to person and knew she was in a hospital  As the eval progress pt demonstrated decreased recall of OTS that was present in the room for 30 mins   Pt had questionable recall of recent events but reported number of kids and grand-kids  Pt required min A X 1 for bed mobility for LE management  While seated at EOB pt had poor balance and required max VC to maintain midline position  Max A x 2 for sit pivot bed > chair transfer and pt was anxious during the transfer and verbalized she was nervous  Pt also benefited from max VC for hand placement, and body mechanics during transfer  Pt presents w/ decreased endurance, standing tolerance, L UE  ROM and strength and cognition  impacting I w/ functional mobility, functional standing tolerance, functional transfers, UB/LB dressing, and bed mobility  While in acute care pt would benefit from OT services and cognitive screen to address deficit areas  From an OT perspective, pt would benefit from post acute rehab when medically stable for discharge from acute care to return to OF  Will continue to follow   Goals   Patient Goals "I want to be able to clean and use the washer machine"  Plan   Treatment Interventions ADL retraining;Functional transfer training;UE strengthening/ROM; Endurance training;Cognitive reorientation;Patient/family training;Equipment evaluation/education; Fine motor coordination activities; Compensatory technique education   Goal Expiration Date 07/26/18   OT Frequency 3-5x/wk   Recommendation   OT Discharge Recommendation Short Term Rehab   Equipment Recommended Bedside commode   OT - OK to Discharge No   Barthel Index   Feeding 10   Bathing 0   Grooming Score 0   Dressing Score 0   Bladder Score 10   Bowels Score 10   Toilet Use Score 5   Transfers (Bed/Chair) Score 5   Mobility (Level Surface) Score 0   Stairs Score 0   Barthel Index Score 40   Modified Soren Scale   Modified [place] Scale 4     Pt goals to be met within 7 days:    1  Pt will complete bed mobility supine <> sit w/ mod I and HOB elevated to engage in ADLs and increase activity tolerance    2  Pt will be able to complete functional transfers <> bed and chair w/ min A and min VC in order to max I in the home environment and optimize safety  3  Pt will complete functional transfer <> toilet w/ min A in order to return to PLOF during toileting  4 Pt will perform clothing management/hygiene during toileting w/ min A to max I w/ toileting  5  Pt will demonstrate fair + dynamic seated balance at EOB during LB dressing in order to return to PLOF   6  Pt will demonstrate fair + static seated balance at EOB during self grooming to return to PLOF  7  Pt will increase sitting tolerance to 15 minutes while demonstrating fair + balance to max I w/ ADL performance    8  Pt will demonstrate good participation during cognitive screen to assist in safe planning upon d/c   9  Pt will be able to eat 100% of food while seated and attending to food in the left visual field w/ min VC    Olivia May, OTS

## 2018-07-19 NOTE — PROGRESS NOTES
Progress Note -   ICU Transfer to Step down/med  surg  - Mana Kyle 80 y o  female MRN: [id number]    Unit/Bed#: ICU 14 Encounter: [id number]      Code Status: Level 1 - Full Code    Date of ICU admission: 7/17/2018    Reason for ICU adm: Stroke (cerebrum) ([facility] [address] ) [I63 9]  Left arm weakness [R29 898]  Ataxia of left upper extremity [R27 0]  Received intravenous tissue plasminogen activator (t-PA) in emergency department [Z92 82]    Active problems:   Patient Active Problem List   Diagnosis    Acute CVA (cerebrovascular accident) ([facility] [address] )    Fall    Ataxia    Closed fracture of distal end of left fibula    Ecchymosis       Summary of clinical course: 79-year-old female with no significant pmhx, presented to the ED after a fall, with left-sided weakness, ataxia and dysarthria, initial NIH 5  TPA given, CTA was negative for acute abnormality or large vessel occlusion/flow limiting stenosis  Was found to have a nondisplaced fracture of the distal left fibula  Placed on stroke pathwway  Statin, echo, repeat Head CT and MRI  Imaging showed conern for stroke in right internal capsule  Final MRI read pending  Recent or scheduled procedures:   tpa 7/17    Recent diagnostics:  7/18/18: Xr Tibia Fibula 2 Vw Left  Impression: Nondisplaced distal fibular fracture  Negative for proximal tibia-fibula fracture  Xr Ankle 3+ Views Left  Result Date: 7/17/2018  Impression: Nondisplaced fracture of the distal fibula  The study was marked in EPIC for significant notification  Ct Head Wo Contrast    Result Date: 7/18/2018  Impression: No acute intracranial abnormality  Mild chronic small vessel ischemic changes and volume loss  Xr Stroke Alert Portable Chest    Result Date: 7/17/2018  Impression: No acute cardiopulmonary disease  Ct Stroke Alert Brain    Result Date: 7/17/2018  Impression: No acute intracranial abnormality  Mild chronic small vessel ischemic changes and volume loss      Cta Stroke Alert (head/neck)    Result Date: 7/17/2018  Impression: No acute intracranial abnormality  No focal stenosis or saccular aneurysm within the [place]  No hemodynamically significant stenosis within either common or internal carotid artery  Less than 50% stenosis by NASCET criteria  Unremarkable bilateral vertebral arteries  Neuro: Patient is hard of hearing, unable to spell world backwards  Slow response time, dyasrthric  CN 2-12 in tact with speech defiicts  RUE 5/5 RUE, and RLE  Left upper extremity weaker than right, 4/5  No focal sensation deficits  Cont q2 hour neurochecks  On ASA and statin  [ ] f/u MRI final read  CV:RRR, normotensive  Goal SBP < 180 after tpa  -echo 55%  Pulm:   No acute issues  Titrate O2 to 92-94%  GI:  Dysphagia diet  :  Monitor Intake and Output  Skin: Encourage turning every 2 hours  F/E/N:   Fluids: NS 0 9% saline 75 cc/hr  Electrolytes replete PRN on AM labs  Nutrition: dysphagia diet  Activity: PT/OT  Ortho following regarding left fibula fracture  Nonweightbearing bearing currently  Hospital discharge planning:  Med surg, ortho folowing  Spoke to Dr Yomaira Rubin regarding transfer

## 2018-07-19 NOTE — RESPIRATORY THERAPY NOTE
RT Protocol Note  Daniel Shelton 80 y o  female MRN: [id number]  Unit/Bed#: ICU 14 Encounter: [id number]    Assessment    Principal Problem:    Acute CVA ([facility]brovascular accident) [facility])  Active Problems:    Fall    Ataxia    Closed fracture of distal end of left fibula    Ecchymosis      Home Pulmonary Medications:  None       Past Medical History:   Diagnosis Date    Arthritis     scoliosis     Social History     Social History    Marital status: Single     Spouse name: N/A    Number of children: N/A    Years of education: N/A     Social History Main Topics    Smoking status: Never Smoker    Smokeless tobacco: Never Used    Alcohol use No    Drug use: No    Sexual activity: No     Other Topics Concern    None     Social History Narrative    None       Subjective    Subjective Data: (P) Patient offers no complaints at this time  Objective    Physical Exam:   Assessment Type: Assess only  General Appearance: Alert, Awake  Respiratory Pattern: Normal  Chest Assessment: Chest expansion symmetrical, Trachea midline  Bilateral Breath Sounds: Clear  Cough: None  O2 Device: RA    Vitals:  Blood pressure 144/66, pulse 72, temperature 98 7 °F (37 1 °C), temperature source Oral, resp  rate (!) 24, height 4' 11" (1 499 m), weight 48 4 kg (106 lb 11 2 oz), SpO2 96 %  Imaging and other studies: I have personally reviewed pertinent reports  O2 Device: RA     Plan    Respiratory Plan: (P) No distress/Pulmonary history, Discontinue Protocol        Resp Comments: (P) Patient was assessed for respiratory protocol  Patient is admitted with CVA  CXR from 7/17/18 was read as clear  BS are clear at the time of this assessment  Patient denies SOB  Patient denies having a pulmonary history and states she takes no respiratory medications at home  Respiratory protocol will be D/C'd at this time

## 2018-07-20 ENCOUNTER — APPOINTMENT (OUTPATIENT)
Dept: NON INVASIVE DIAGNOSTICS | Facility: HOSPITAL | Age: 83
DRG: 041 | End: 2018-07-20
Payer: MEDICARE

## 2018-07-20 ENCOUNTER — APPOINTMENT (INPATIENT)
Dept: NON INVASIVE DIAGNOSTICS | Facility: HOSPITAL | Age: 83
DRG: 041 | End: 2018-07-20
Attending: INTERNAL MEDICINE
Payer: MEDICARE

## 2018-07-20 PROBLEM — E87.6 HYPOKALEMIA: Status: ACTIVE | Noted: 2018-07-20

## 2018-07-20 LAB
ANION GAP SERPL CALCULATED.3IONS-SCNC: 6 MMOL/L (ref 4–13)
BASOPHILS # BLD AUTO: 0.04 THOUSANDS/ΜL (ref 0–0.1)
BASOPHILS NFR BLD AUTO: 1 % (ref 0–1)
BUN SERPL-MCNC: 21 MG/DL (ref 5–25)
CALCIUM SERPL-MCNC: 8.1 MG/DL (ref 8.3–10.1)
CHLORIDE SERPL-SCNC: 114 MMOL/L (ref 100–108)
CO2 SERPL-SCNC: 25 MMOL/L (ref 21–32)
CREAT SERPL-MCNC: 0.48 MG/DL (ref 0.6–1.3)
EOSINOPHIL # BLD AUTO: 0.21 THOUSAND/ΜL (ref 0–0.61)
EOSINOPHIL NFR BLD AUTO: 3 % (ref 0–6)
ERYTHROCYTE [DISTWIDTH] IN BLOOD BY AUTOMATED COUNT: 11.9 % (ref 11.6–15.1)
GFR SERPL CREATININE-BSD FRML MDRD: 90 ML/MIN/1.73SQ M
GLUCOSE SERPL-MCNC: 97 MG/DL (ref 65–140)
HCT VFR BLD AUTO: 34.5 % (ref 34.8–46.1)
HGB BLD-MCNC: 10.8 G/DL (ref 11.5–15.4)
IMM GRANULOCYTES # BLD AUTO: 0.04 THOUSAND/UL (ref 0–0.2)
IMM GRANULOCYTES NFR BLD AUTO: 1 % (ref 0–2)
LYMPHOCYTES # BLD AUTO: 1.06 THOUSANDS/ΜL (ref 0.6–4.47)
LYMPHOCYTES NFR BLD AUTO: 13 % (ref 14–44)
MCH RBC QN AUTO: 29.8 PG (ref 26.8–34.3)
MCHC RBC AUTO-ENTMCNC: 31.3 G/DL (ref 31.4–37.4)
MCV RBC AUTO: 95 FL (ref 82–98)
MONOCYTES # BLD AUTO: 1.07 THOUSAND/ΜL (ref 0.17–1.22)
MONOCYTES NFR BLD AUTO: 13 % (ref 4–12)
NEUTROPHILS # BLD AUTO: 5.95 THOUSANDS/ΜL (ref 1.85–7.62)
NEUTS SEG NFR BLD AUTO: 69 % (ref 43–75)
NRBC BLD AUTO-RTO: 0 /100 WBCS
PLATELET # BLD AUTO: 187 THOUSANDS/UL (ref 149–390)
PMV BLD AUTO: 9.8 FL (ref 8.9–12.7)
POTASSIUM SERPL-SCNC: 3.4 MMOL/L (ref 3.5–5.3)
RBC # BLD AUTO: 3.62 MILLION/UL (ref 3.81–5.12)
SODIUM SERPL-SCNC: 145 MMOL/L (ref 136–145)
WBC # BLD AUTO: 8.37 THOUSAND/UL (ref 4.31–10.16)

## 2018-07-20 PROCEDURE — 93312 ECHO TRANSESOPHAGEAL: CPT | Performed by: INTERNAL MEDICINE

## 2018-07-20 PROCEDURE — 85025 COMPLETE CBC W/AUTO DIFF WBC: CPT | Performed by: INTERNAL MEDICINE

## 2018-07-20 PROCEDURE — 33282 HB IMPLANT PAT-ACTIVE HT RECORD: CPT | Performed by: PHYSICIAN ASSISTANT

## 2018-07-20 PROCEDURE — 93325 DOPPLER ECHO COLOR FLOW MAPG: CPT | Performed by: INTERNAL MEDICINE

## 2018-07-20 PROCEDURE — 80048 BASIC METABOLIC PNL TOTAL CA: CPT | Performed by: INTERNAL MEDICINE

## 2018-07-20 PROCEDURE — C1764 EVENT RECORDER, CARDIAC: HCPCS

## 2018-07-20 PROCEDURE — 93312 ECHO TRANSESOPHAGEAL: CPT

## 2018-07-20 PROCEDURE — 33282 PR IMPLANTATION PT-ACTIVATED CARDIAC EVENT RECORDER: CPT | Performed by: INTERNAL MEDICINE

## 2018-07-20 PROCEDURE — 99232 SBSQ HOSP IP/OBS MODERATE 35: CPT | Performed by: NURSE PRACTITIONER

## 2018-07-20 PROCEDURE — 93320 DOPPLER ECHO COMPLETE: CPT | Performed by: INTERNAL MEDICINE

## 2018-07-20 PROCEDURE — 0JH632Z INSERTION OF MONITORING DEVICE INTO CHEST SUBCUTANEOUS TISSUE AND FASCIA, PERCUTANEOUS APPROACH: ICD-10-PCS | Performed by: INTERNAL MEDICINE

## 2018-07-20 RX ORDER — OXYCODONE HYDROCHLORIDE 5 MG/1
2.5 TABLET ORAL EVERY 4 HOURS PRN
Status: DISCONTINUED | OUTPATIENT
Start: 2018-07-20 | End: 2018-07-22 | Stop reason: HOSPADM

## 2018-07-20 RX ORDER — PROPOFOL 10 MG/ML
INJECTION, EMULSION INTRAVENOUS AS NEEDED
Status: DISCONTINUED | OUTPATIENT
Start: 2018-07-20 | End: 2018-07-20 | Stop reason: SURG

## 2018-07-20 RX ORDER — FENTANYL CITRATE 50 UG/ML
INJECTION, SOLUTION INTRAMUSCULAR; INTRAVENOUS AS NEEDED
Status: DISCONTINUED | OUTPATIENT
Start: 2018-07-20 | End: 2018-07-20 | Stop reason: SURG

## 2018-07-20 RX ORDER — SODIUM CHLORIDE 9 MG/ML
INJECTION, SOLUTION INTRAVENOUS CONTINUOUS PRN
Status: DISCONTINUED | OUTPATIENT
Start: 2018-07-20 | End: 2018-07-20 | Stop reason: SURG

## 2018-07-20 RX ORDER — LIDOCAINE HYDROCHLORIDE AND EPINEPHRINE 10; 10 MG/ML; UG/ML
INJECTION, SOLUTION INFILTRATION; PERINEURAL CODE/TRAUMA/SEDATION MEDICATION
Status: COMPLETED | OUTPATIENT
Start: 2018-07-20 | End: 2018-07-20

## 2018-07-20 RX ORDER — ACETAMINOPHEN 325 MG/1
650 TABLET ORAL EVERY 6 HOURS PRN
Status: DISCONTINUED | OUTPATIENT
Start: 2018-07-20 | End: 2018-07-22 | Stop reason: HOSPADM

## 2018-07-20 RX ORDER — POTASSIUM CHLORIDE 20 MEQ/1
40 TABLET, EXTENDED RELEASE ORAL ONCE
Status: COMPLETED | OUTPATIENT
Start: 2018-07-20 | End: 2018-07-20

## 2018-07-20 RX ADMIN — ASPIRIN 81 MG: 81 TABLET, COATED ORAL at 14:27

## 2018-07-20 RX ADMIN — FENTANYL CITRATE 25 MCG: 50 INJECTION, SOLUTION INTRAMUSCULAR; INTRAVENOUS at 10:17

## 2018-07-20 RX ADMIN — LIDOCAINE HYDROCHLORIDE AND EPINEPHRINE 20 ML: 10; 10 INJECTION, SOLUTION INFILTRATION; PERINEURAL at 08:18

## 2018-07-20 RX ADMIN — ACETAMINOPHEN 650 MG: 325 TABLET, FILM COATED ORAL at 20:59

## 2018-07-20 RX ADMIN — PROPOFOL 30 MG: 10 INJECTION, EMULSION INTRAVENOUS at 10:25

## 2018-07-20 RX ADMIN — NYSTATIN: 100000 POWDER TOPICAL at 08:45

## 2018-07-20 RX ADMIN — PROPOFOL 50 MG: 10 INJECTION, EMULSION INTRAVENOUS at 10:17

## 2018-07-20 RX ADMIN — NYSTATIN: 100000 POWDER TOPICAL at 19:19

## 2018-07-20 RX ADMIN — ATORVASTATIN CALCIUM 40 MG: 40 TABLET, FILM COATED ORAL at 19:18

## 2018-07-20 RX ADMIN — SODIUM CHLORIDE: 0.9 INJECTION, SOLUTION INTRAVENOUS at 10:09

## 2018-07-20 RX ADMIN — PROPOFOL 30 MG: 10 INJECTION, EMULSION INTRAVENOUS at 10:20

## 2018-07-20 RX ADMIN — POTASSIUM CHLORIDE 40 MEQ: 1500 TABLET, EXTENDED RELEASE ORAL at 14:27

## 2018-07-20 RX ADMIN — PROPOFOL 30 MG: 10 INJECTION, EMULSION INTRAVENOUS at 10:30

## 2018-07-20 NOTE — ASSESSMENT & PLAN NOTE
· Acute right thalamic infarct  · Neurology following, S/P TPA  · MRI with 1 cm acute infarct right posterior thalamus  · CTA head and neck no large vessel occlusion or flow limiting disease  · Patient has residual left sided weakness, facial droop  · Continue stroke pathway, PT/OT/ST  · Continue ASA and statin  · Neuro-checks Q4h, have been stable  · S/P MARGARITO and loop recorder placement  · Patient has left sided weakness, dysarthria and ataxia with NIH scale of 5, fall with fracture

## 2018-07-20 NOTE — NURSING NOTE
MARGARITO completed in cardiology  Report called to floor RN  Back to room via stretcher with transport services

## 2018-07-20 NOTE — ANESTHESIA POSTPROCEDURE EVALUATION
Post-Op Assessment Note      CV Status:  Stable    Mental Status:  Somnolent    Hydration Status:  Euvolemic    PONV Controlled:  Controlled    Airway Patency:  Patent    Post Op Vitals Reviewed: Yes          Staff: CRNA       Comments: 120/70, 73, 16  97%  report to RN          BP      Temp      Pulse     Resp      SpO2

## 2018-07-20 NOTE — ASSESSMENT & PLAN NOTE
· Orthopedics following, no surgical intervention needed  · Continue with WBAT LLE with cam walker  · PT  · Fall precautions  · Pain control, neurovascular checks LLE QS  · She needs to follow up with orthopedics in 2-4 weeks  · A/C is contraindicated due to TPA, would need neurology recommendations as to when to start a/c

## 2018-07-20 NOTE — ANESTHESIA PREPROCEDURE EVALUATION
Review of Systems/Medical History  Patient summary reviewed  Chart reviewed  No history of anesthetic complications     Cardiovascular  EKG reviewed, Exercise tolerance (METS): >4,     Pulmonary       GI/Hepatic            Endo/Other     GYN       Hematology   Musculoskeletal    Arthritis     Neurology    CVA , residual symptoms,    Psychology           Physical Exam    Airway    Mallampati score: II  TM Distance: >3 FB  Neck ROM: full     Dental   lower dentures and upper dentures,     Cardiovascular      Pulmonary      Other Findings        Anesthesia Plan  ASA Score- 3     Anesthesia Type- IV sedation with anesthesia with ASA Monitors  Additional Monitors:   Airway Plan:         Plan Factors-    Induction- intravenous  Postoperative Plan-     Informed Consent- Anesthetic plan and risks discussed with patient  I personally reviewed this patient with the CRNA  Discussed and agreed on the Anesthesia Plan with the CRNA  Simona Bell

## 2018-07-20 NOTE — OCCUPATIONAL THERAPY NOTE
Occupational Therapy Cancel Note:    Occupational therapy attempted however pt for testing  OT will attempt again next treatment       Rohit Burks

## 2018-07-20 NOTE — PROGRESS NOTES
Progress Note - Kenzie Guzman 1934, 80 y o  female MRN: [id number]    Unit/Bed#: [facility] 718-01 Encounter: [id number]    Primary Care Provider: Argentina Kirby MD   Date and time admitted to hospital: 7/17/2018  4:35 PM        * Acute CVA (cerebrovascular accident) [facility])   Assessment & Plan    · Acute right thalamic infarct  · Neurology following, S/P TPA  · MRI with 1 cm acute infarct right posterior thalamus  · CTA head and neck no large vessel occlusion or flow limiting disease  · Patient has residual left sided weakness, facial droop  · Continue stroke pathway, PT/OT/ST  · Continue ASA and statin  · Neuro-checks Q4h, have been stable  · S/P MARGARITO and loop recorder placement  · Patient has left sided weakness, dysarthria and ataxia with NIH scale of 5, fall with fracture            Hypokalemia   Assessment & Plan    · Replete   · Monitor BMP        Ecchymosis s/p fall   Assessment & Plan    · Left chin, LUE (shoulder to fingers)  · Supportive care  · Elevate LUE        Closed fracture of distal end of left fibula   Assessment & Plan    · Orthopedics following, no surgical intervention needed  · Continue with WBAT LLE with cam walker  · PT  · Fall precautions  · Pain control, neurovascular checks LLE QS  · She needs to follow up with orthopedics in 2-4 weeks  · A/C is contraindicated due to TPA, would need neurology recommendations as to when to start a/c        Ataxia   Assessment & Plan    · Possibly related to new stroke   · PT/OT, needs STR          Fall   Assessment & Plan    · Suspected secondary to acute CVA, ataxia and dysarthria (patient recalled weakness left side and falling)  · PT/OT recommending STR, PMR consult in progress          VTE Pharmacologic Prophylaxis:   Pharmacologic: Pharmacologic VTE Prophylaxis contraindicated due to acute CVA, s/p TPA  Mechanical VTE Prophylaxis in Place: Yes    Patient Centered Rounds: I have performed bedside rounds with nursing staff today      Discussions with Specialists or Other Care Team Provider: neurology    Education and Discussions with Family / Patient: patient    Time Spent for Care: 30 minutes  More than 50% of total time spent on counseling and coordination of care as described above  Current Length of Stay: 3 day(s)    Current Patient Status: Inpatient   Certification Statement: The patient will continue to require additional inpatient hospital stay due to need for safe discharge plan, needs STR, CM working on placement    Discharge Plan: STR when medically stable and arrangements made, awaiting neuro clearance     Code Status: Level 1 - Full Code      Subjective:   Patient denies CP, SOB, HA, dizziness  Reports LLE pain with mvmt/touch  Objective:     Vitals:   Temp (24hrs), Av 7 °F (36 5 °C), Min:95 2 °F (35 1 °C), Max:98 9 °F (37 2 °C)    HR:  [72-93] 77  Resp:  [16-20] 20  BP: (128-183)/(64-89) 142/73  SpO2:  [92 %-97 %] 97 %  Body mass index is 22 84 kg/m²  Input and Output Summary (last 24 hours): Intake/Output Summary (Last 24 hours) at 18 1150  Last data filed at 18 1030   Gross per 24 hour   Intake              220 ml   Output                0 ml   Net              220 ml       Physical Exam:     Physical Exam   Constitutional: She is oriented to person, place, and time  She appears well-developed  She appears cachectic  No distress  Frail weak appearing   HENT:   Head: Normocephalic  Mouth/Throat: Oropharynx is clear and moist    eccymosis left chin  Left facial droop  Poor dentician    Eyes: Pupils are equal, round, and reactive to light  Neck: Neck supple  Cardiovascular: Normal rate, regular rhythm and intact distal pulses  No murmur heard  Pulmonary/Chest: Effort normal and breath sounds normal  No respiratory distress  Abdominal: Soft  Bowel sounds are normal  She exhibits no distension  There is no tenderness  Musculoskeletal: Normal range of motion  She exhibits edema     Edema LUE and LLE, pulses 2+  Cam walker in place to LLE, neurovascular exam intact   Neurological: She is alert and oriented to person, place, and time  Weakness left side   Skin: Skin is warm and dry  eccymosis with 1+ edema LUE  Dressing D&I left chest wall   Psychiatric: She has a normal mood and affect  Her behavior is normal    Vitals reviewed  Additional Data:     Labs:      Results from last 7 days  Lab Units 07/20/18  0503   WBC Thousand/uL 8 37   HEMOGLOBIN g/dL 10 8*   HEMATOCRIT % 34 5*   PLATELETS Thousands/uL 187   NEUTROS PCT % 69   LYMPHS PCT % 13*   MONOS PCT % 13*   EOS PCT % 3       Results from last 7 days  Lab Units 07/20/18  0503  07/17/18  1644   SODIUM mmol/L 145  < > 144   POTASSIUM mmol/L 3 4*  < > 4 1   CHLORIDE mmol/L 114*  < > 109*   CO2 mmol/L 25  < > 30   BUN mg/dL 21  < > 21   CREATININE mg/dL 0 48*  < > 0 75   CALCIUM mg/dL 8 1*  < > 8 9   TOTAL PROTEIN g/dL  --   --  6 9   BILIRUBIN TOTAL mg/dL  --   --  0 45   ALK PHOS U/L  --   --  68   ALT U/L  --   --  22   AST U/L  --   --  27   GLUCOSE RANDOM mg/dL 97  < > 149*   < > = values in this interval not displayed  Results from last 7 days  Lab Units 07/17/18  1644   INR  1 00       * I Have Reviewed All Lab Data Listed Above  * Additional Pertinent Lab Tests Reviewed: All Labs Within Last 24 Hours Reviewed    Imaging:    Imaging Reports Reviewed Today Include: CTA, MRI, xray l wrist, xray left tibia/fibula  Imaging Personally Reviewed by Myself Includes:  Xray LLE    Recent Cultures (last 7 days):           Last 24 Hours Medication List:     Current Facility-Administered Medications:  aspirin 81 mg Oral Daily Stiven Jimenez MD   atorvastatin 40 mg Oral QPM Danae Allen MD   nystatin  Topical BID Edmar Doss MD   potassium chloride 40 mEq Oral Once MOUSTAPHA Levy        Today, Patient Was Seen By: MOUSTAPHA Levy    ** Please Note: Dictation voice to text software may have been used in the creation of this document  **

## 2018-07-20 NOTE — SOCIAL WORK
CM met with patient and son Juliana Nolan [phone] at bedside to discuss PT/OT recommendation for rehab  Pt's son requesting referral to [address] [place], [place] [address]  CM provided SNF list for patient and family to review and provide back up choices  Referral placed in [facility]  CM will follow

## 2018-07-20 NOTE — ASSESSMENT & PLAN NOTE
· Suspected secondary to acute CVA, ataxia and dysarthria (patient recalled weakness left side and falling)  · PT/OT recommending STR, PMR consult in progress

## 2018-07-21 PROBLEM — E87.6 HYPOKALEMIA: Status: RESOLVED | Noted: 2018-07-20 | Resolved: 2018-07-21

## 2018-07-21 LAB
ANION GAP SERPL CALCULATED.3IONS-SCNC: 4 MMOL/L (ref 4–13)
BUN SERPL-MCNC: 26 MG/DL (ref 5–25)
CALCIUM SERPL-MCNC: 8.2 MG/DL (ref 8.3–10.1)
CHLORIDE SERPL-SCNC: 113 MMOL/L (ref 100–108)
CO2 SERPL-SCNC: 28 MMOL/L (ref 21–32)
CREAT SERPL-MCNC: 0.53 MG/DL (ref 0.6–1.3)
GFR SERPL CREATININE-BSD FRML MDRD: 87 ML/MIN/1.73SQ M
GLUCOSE SERPL-MCNC: 94 MG/DL (ref 65–140)
POTASSIUM SERPL-SCNC: 4.2 MMOL/L (ref 3.5–5.3)
SODIUM SERPL-SCNC: 145 MMOL/L (ref 136–145)

## 2018-07-21 PROCEDURE — 99232 SBSQ HOSP IP/OBS MODERATE 35: CPT | Performed by: NURSE PRACTITIONER

## 2018-07-21 PROCEDURE — 80048 BASIC METABOLIC PNL TOTAL CA: CPT | Performed by: NURSE PRACTITIONER

## 2018-07-21 RX ADMIN — NYSTATIN: 100000 POWDER TOPICAL at 22:09

## 2018-07-21 RX ADMIN — ASPIRIN 81 MG: 81 TABLET, COATED ORAL at 09:16

## 2018-07-21 RX ADMIN — ACETAMINOPHEN 650 MG: 325 TABLET, FILM COATED ORAL at 17:50

## 2018-07-21 RX ADMIN — ATORVASTATIN CALCIUM 40 MG: 40 TABLET, FILM COATED ORAL at 17:49

## 2018-07-21 NOTE — PROGRESS NOTES
Patient complaining of chest pain after I heard family say to patient that if she has any chest pain she should call the nurse  Patient is very sore on the left side from the fall she had  Vitals are 115/63 HR 76  Patient feels like its an ache states "its hard to explain " asked patient how long she has had it and she stated " a while now " I will call slim to advise

## 2018-07-21 NOTE — ASSESSMENT & PLAN NOTE
· Orthopedics following, no surgical intervention needed  · Continue with WBAT LLE with cam walker  · PT/OT  · Fall precautions  · Pain control, neurovascular checks LLE QS  · She needs to follow up with orthopedics in 2-4 weeks  · A/C is contraindicated due to TPA, discussed with Neurology they will look in to when a c  can be started

## 2018-07-21 NOTE — ASSESSMENT & PLAN NOTE
· Acute right thalamic infarct  · Neurology following, S/P TPA  · MRI with 1 cm acute infarct right posterior thalamus  · CTA head and neck no large vessel occlusion or flow limiting disease  · Patient has left sided weakness, dysarthria and ataxia with NIH scale of 5,s/p  fall with fracture  · Continue stroke pathway, PT/OT/ST  · Continue ASA and statin  · Neuro-checks Q4h, have been stable  · S/P MARGARITO no echocardiographic evidence for cardiac source of embolism and loop recorder placement

## 2018-07-21 NOTE — PLAN OF CARE
Activity Intolerance/Impaired Mobility     Mobility/activity is maintained at optimum level for patient Progressing        CARDIOVASCULAR - ADULT     Maintains optimal cardiac output and hemodynamic stability Progressing     Absence of cardiac dysrhythmias or at baseline rhythm Progressing        Communication Impairment     Ability to express needs and understand communication Geneva Monsalve Discharge to home or other facility with appropriate resources Progressing        DISCHARGE PLANNING - CARE MANAGEMENT     Discharge to post-acute care or home with appropriate resources Progressing        GASTROINTESTINAL - ADULT     Maintains or returns to baseline bowel function Progressing     Maintains adequate nutritional intake Progressing        GENITOURINARY - ADULT     Maintains or returns to baseline urinary function Progressing        INFECTION - ADULT     Absence or prevention of progression during hospitalization Progressing     Absence of fever/infection during neutropenic period Progressing        Knowledge Deficit     Patient/family/caregiver demonstrates understanding of disease process, treatment plan, medications, and discharge instructions Progressing        METABOLIC, FLUID AND ELECTROLYTES - ADULT     Electrolytes maintained within normal limits Progressing     Fluid balance maintained Progressing        Neurological Deficit     Neurological status is stable or improving Progressing        NEUROSENSORY - ADULT     Achieves stable or improved neurological status Progressing     Absence of seizures Progressing     Achieves maximal functionality and self care Progressing        Nutrition     Nutrition/Hydration status is improving Progressing        Nutrition/Hydration-ADULT     Nutrient/Hydration intake appropriate for improving, restoring or maintaining nutritional needs Progressing        PAIN - ADULT     Verbalizes/displays adequate comfort level or baseline comfort level Progressing        Potential for Aspiration     Non-ventilated patient's risk of aspiration is minimized Progressing        Potential for Falls     Patient will remain free of falls Progressing        Prexisting or High Potential for Compromised Skin Integrity     Skin integrity is maintained or improved Progressing        RESPIRATORY - ADULT     Achieves optimal ventilation and oxygenation Progressing        SAFETY ADULT     Maintain or return to baseline ADL function Progressing     Maintain or return mobility status to optimal level Progressing     Patient will remain free of falls Progressing

## 2018-07-21 NOTE — PROGRESS NOTES
Progress Note - Juliocesar Soila 1934, 80 y o  female MRN: [id number]    Unit/Bed#: [facility] 720-01 Encounter: [id number]    Primary Care Provider: Felecia Ramachandran MD   Date and time admitted to hospital: 7/17/2018  4:35 PM        * Acute CVA (cerebrovascular accident) [facility])   Assessment & Plan    · Acute right thalamic infarct  · Neurology following, S/P TPA  · MRI with 1 cm acute infarct right posterior thalamus  · CTA head and neck no large vessel occlusion or flow limiting disease  · Patient has left sided weakness, dysarthria and ataxia with NIH scale of 5,s/p  fall with fracture  · Continue stroke pathway, PT/OT/ST  · Continue ASA and statin  · Neuro-checks Q4h, have been stable  · S/P MARGARITO no echocardiographic evidence for cardiac source of embolism and loop recorder placement            Ecchymosis s/p fall   Assessment & Plan    · Left chin, LUE (shoulder to fingers), LLE  · Supportive care  · Elevate LUE        Closed fracture of distal end of left fibula   Assessment & Plan    · Orthopedics following, no surgical intervention needed  · Continue with WBAT LLE with cam walker  · PT/OT  · Fall precautions  · Pain control, neurovascular checks LLE QS  · She needs to follow up with orthopedics in 2-4 weeks  · A/C is contraindicated due to TPA, discussed with Neurology they will look in to when a c  can be started        Fall   Assessment & Plan    · Suspected secondary to acute CVA, ataxia and dysarthria (patient recalled weakness left side and falling)  · PT/OT recommending STR, referrals made to [facility] and [facility]          VTE Pharmacologic Prophylaxis:   Pharmacologic: Pharmacologic VTE Prophylaxis contraindicated due to Status post tPA  Mechanical VTE Prophylaxis in Place: No    Patient Centered Rounds: I have performed bedside rounds with nursing staff today      Discussions with Specialists or Other Care Team Provider:  Neurology    Education and Discussions with Family / Patient:  Patient and discussed with patient's son Quita Preston and daughter-in-law    Time Spent for Care: 30 minutes  More than 50% of total time spent on counseling and coordination of care as described above  Current Length of Stay: 4 day(s)    Current Patient Status: Inpatient   Certification Statement: The patient will continue to require additional inpatient hospital stay due to Patient requires short-term rehab referrals have been made awaiting approval otherwise at this time she is an unsafe discharge secondary to acute CVA    Discharge Plan:  Short-term rehab when bed available, suspect will need another 24-48 hours for this process to be completed    Code Status: Level 1 - Full Code      Subjective:   Patient denies any chest pain she has had no shortness of breath no nausea or vomiting  Appetite is fair requires assistance for meals  Has slight left-sided weakness and ecchymosis to the left face arm and leg  No overnight events    Objective:     Vitals:   Temp (24hrs), Av 5 °F (36 9 °C), Min:97 1 °F (36 2 °C), Max:99 8 °F (37 7 °C)    HR:  [68-85] 69  Resp:  [18] 18  BP: (140-142)/(67-74) 140/74  SpO2:  [92 %-97 %] 97 %  Body mass index is 22 84 kg/m²  Input and Output Summary (last 24 hours): Intake/Output Summary (Last 24 hours) at 18 1152  Last data filed at 18 6315   Gross per 24 hour   Intake              240 ml   Output              160 ml   Net               80 ml       Physical Exam:     Physical Exam   Constitutional: She is oriented to person, place, and time  She appears well-developed  She appears cachectic  No distress  Weak appearing   HENT:   Head: Normocephalic  Mouth/Throat: Oropharynx is clear and moist    Poor dentition   Neck: Normal range of motion  Neck supple  Cardiovascular: Normal rate, regular rhythm and intact distal pulses  No murmur heard  Pulmonary/Chest: Effort normal and breath sounds normal  No respiratory distress  Abdominal: Soft   Bowel sounds are normal  She exhibits no distension  There is no tenderness  Musculoskeletal: Normal range of motion  She exhibits edema and tenderness  Tenderness left upper extremity with movement, 1+ edema of left upper extremity  Left lower extremity in Cam walker boot, ankle edema noted, capillary refill 3 seconds, pedal pulse palpable 2+, tactile temp warm   Neurological: She is alert and oriented to person, place, and time  Slight left-sided weakness, left facial droop   Skin: Skin is warm and dry  Ecchymosis left chin, left arm, left lower extremity  Dressing dry and intact to left chest wall   Psychiatric: She has a normal mood and affect  Her behavior is normal    Vitals reviewed  Additional Data:     Labs:      Results from last 7 days  Lab Units 07/20/18  0503   WBC Thousand/uL 8 37   HEMOGLOBIN g/dL 10 8*   HEMATOCRIT % 34 5*   PLATELETS Thousands/uL 187   NEUTROS PCT % 69   LYMPHS PCT % 13*   MONOS PCT % 13*   EOS PCT % 3       Results from last 7 days  Lab Units 07/21/18  0507  07/17/18  1644   SODIUM mmol/L 145  < > 144   POTASSIUM mmol/L 4 2  < > 4 1   CHLORIDE mmol/L 113*  < > 109*   CO2 mmol/L 28  < > 30   BUN mg/dL 26*  < > 21   CREATININE mg/dL 0 53*  < > 0 75   CALCIUM mg/dL 8 2*  < > 8 9   TOTAL PROTEIN g/dL  --   --  6 9   BILIRUBIN TOTAL mg/dL  --   --  0 45   ALK PHOS U/L  --   --  68   ALT U/L  --   --  22   AST U/L  --   --  27   GLUCOSE RANDOM mg/dL 94  < > 149*   < > = values in this interval not displayed  Results from last 7 days  Lab Units 07/17/18  1644   INR  1 00       * I Have Reviewed All Lab Data Listed Above  * Additional Pertinent Lab Tests Reviewed:  All Labs Within Last 24 Hours Reviewed    Imaging:    Imaging Reports Reviewed Today Include:  MARGARITO, MRI  Imaging Personally Reviewed by Myself Includes:  None    Recent Cultures (last 7 days):           Last 24 Hours Medication List:     Current Facility-Administered Medications:  acetaminophen 650 mg Oral Q6H PRN Litzy Chung, ONEIDA   aspirin 81 mg Oral Daily Saji Nicole MD   atorvastatin 40 mg Oral QPM Margarette Ashford MD   nystatin  Topical BID Lissett Schmidt MD   oxyCODONE 2 5 mg Oral Q4H PRN Allan Chung PA-C        Today, Patient Was Seen By: MOUSTAPHA Winchester    ** Please Note: Dictation voice to text software may have been used in the creation of this document   **

## 2018-07-21 NOTE — ASSESSMENT & PLAN NOTE
· Suspected secondary to acute CVA, ataxia and dysarthria (patient recalled weakness left side and falling)  · PT/OT recommending STR, referrals made to [facility] and [facility]

## 2018-07-21 NOTE — PROGRESS NOTES
Family wants patient to go to [facility] due to keeping patient's physicians in the same network she is currently in

## 2018-07-22 VITALS
HEIGHT: 59 IN | WEIGHT: 113.1 LBS | RESPIRATION RATE: 18 BRPM | DIASTOLIC BLOOD PRESSURE: 78 MMHG | HEART RATE: 73 BPM | BODY MASS INDEX: 22.8 KG/M2 | SYSTOLIC BLOOD PRESSURE: 147 MMHG | TEMPERATURE: 98.4 F | OXYGEN SATURATION: 91 %

## 2018-07-22 PROCEDURE — 99239 HOSP IP/OBS DSCHRG MGMT >30: CPT | Performed by: PHYSICIAN ASSISTANT

## 2018-07-22 RX ORDER — ATORVASTATIN CALCIUM 40 MG/1
40 TABLET, FILM COATED ORAL EVERY EVENING
Qty: 30 TABLET | Refills: 0 | Status: SHIPPED | OUTPATIENT
Start: 2018-07-22

## 2018-07-22 RX ORDER — ASPIRIN 81 MG/1
81 TABLET ORAL DAILY
Qty: 30 TABLET | Refills: 0 | Status: SHIPPED | OUTPATIENT
Start: 2018-07-23

## 2018-07-22 RX ORDER — OXYCODONE HYDROCHLORIDE 5 MG/1
2.5 TABLET ORAL EVERY 4 HOURS PRN
Qty: 15 TABLET | Refills: 0 | Status: SHIPPED | OUTPATIENT
Start: 2018-07-22 | End: 2018-08-01

## 2018-07-22 RX ORDER — NYSTATIN 100000 [USP'U]/G
POWDER TOPICAL 2 TIMES DAILY
Qty: 15 G | Refills: 0 | Status: SHIPPED | OUTPATIENT
Start: 2018-07-22 | End: 2018-08-23 | Stop reason: CLARIF

## 2018-07-22 RX ADMIN — OXYCODONE HYDROCHLORIDE 2.5 MG: 5 TABLET ORAL at 08:14

## 2018-07-22 RX ADMIN — NYSTATIN: 100000 POWDER TOPICAL at 08:14

## 2018-07-22 RX ADMIN — ASPIRIN 81 MG: 81 TABLET, COATED ORAL at 08:15

## 2018-07-22 RX ADMIN — ACETAMINOPHEN 650 MG: 325 TABLET, FILM COATED ORAL at 02:56

## 2018-07-22 NOTE — SOCIAL WORK
CM notified that patient is medically clear for d/c  Patient accepted at [facility]  CM contacted patient's son Carlo Smith ([phone]) who reported that [facility] and he is agreeable to d/c there today  Per Susanne Anderson at [facility] bed is available today  CM arranged BLS transport for patient with [facility] for 1 pm   CM notified son, and BISMARK Wilder of transport time  Medical Necessity completed  Report #'s completed in EPIC

## 2018-07-22 NOTE — ASSESSMENT & PLAN NOTE
· Acute right thalamic infarct  · Neurology following, S/P TPA  · MRI with 1 cm acute infarct right posterior thalamus  · CTA head and neck no large vessel occlusion or flow limiting disease  · Continue ASA and statin  · S/P MARGARITO and loop recorder placement

## 2018-07-22 NOTE — DISCHARGE SUMMARY
Discharge- Chantal Abebe 1934, 80 y o  female MRN: [id number]    Unit/Bed#: [facility] 720-01 Encounter: [id number]    Primary Care Provider: Jennie Christensen MD   Date and time admitted to hospital: 7/17/2018  4:35 PM    * Acute CVA (cerebrovascular accident) [facility])   Assessment & Plan    · Acute right thalamic infarct  · Neurology following, S/P TPA  · MRI with 1 cm acute infarct right posterior thalamus  · CTA head and neck no large vessel occlusion or flow limiting disease  · Continue ASA and statin  · S/P MARGARITO and loop recorder placement        Ecchymosis s/p fall   Assessment & Plan    · Ecchymosis s/p fall and TPA administration  · Left chin, LUE (shoulder to fingers), LLE  · Supportive care  · Elevate LUE        Closed fracture of distal end of left fibula   Assessment & Plan    · Continue with WBAT LLE with cam walker  · PT/OT  · Ortho f/u in 2-4 weeks          Discharging Physician / Practitioner: Tiffanie Whittington PA-C  PCP: Jennie Christensen MD  Admission Date:   Admission Orders     Ordered        07/17/18 1900  Inpatient Admission  Once             Discharge Date: 07/22/18    Disposition:       Short Term Rehab or SNF at [address] [place] [address] to [facility] SNF:   · [place] -      Reason for Admission: Fall, CVA    Discharge Diagnoses:     Please see assessment and plan section above for further details regarding discharge diagnoses  Resolved Problems  Date Reviewed: 7/22/2018          Resolved    Hypokalemia 7/21/2018     Resolved by  Hali Maldonado, [address] [place] [address] Stay:  · Neurology  · Orthopedics    Procedures Performed:     · MARGARITO and loop recorder placement 7/20/18 (MARGARITO negative for thrombus   EF 55%)    Medication Adjustments and Discharge Medications:  · Summary of Medication Adjustments made as a result of this hospitalization: Started on aspirin and Lipitor  · Medication Dosing Tapers - Please refer to Discharge Medication List for details on any medication dosing tapers (if applicable to patient)  · Medications being temporarily held (include recommended restart time): None  · Discharge Medication List: See after visit summary for reconciled discharge medications  Wound Care Recommendations:  When applicable, please see wound care section of After Visit Summary  Diet Recommendations at Discharge:  Diet -        Diet Orders            Start     Ordered    07/20/18 1140  Diet Dysphagia/Modified Consistency; Dysphagia 2-Mechanical Soft; Thin Liquid  Diet effective now     Question Answer Comment   Diet Type Dysphagia/Modified Consistency    Dysphagia/Modified Consistency Dysphagia 2-Mechanical Soft    Liquid Modifier Thin Liquid    RD to adjust diet per protocol? Yes        07/20/18 1139          Instructions for any Catheters / Lines Present at Discharge (including removal date, if applicable): N/A    Significant Findings / Test Results:     MRI brain wo contrast   Final Result by Maikel Durant DO (07/19 2908)      1 cm acute infarction identified within the right posterior lateral thalamus, see series 3 image 15  No mass effect or hemorrhagic transformation  Moderate diffuse cerebral volume loss and mild diffuse white matter change suggestive of chronic microangiopathy  The study was marked in [facility] for immediate notification  Workstation performed: [id number]         CT head wo contrast   Final Result by Charo Sorensen MD (07/18 1939)      No acute intracranial abnormality  Mild chronic small vessel ischemic changes and volume loss  Workstation performed: [id number]         XR ankle 2 vw left   Final Result by Avery Gipson MD (07/18 1631)      Oblique fracture again noted of the distal fibula  Workstation performed: [id number]         XR tibia fibula 2 vw left   Final Result by Gomez Izquierdo DO (07/18 7558)      Nondisplaced distal fibular fracture        Negative for proximal tibia-fibula fracture  Workstation performed: [id number]         XR wrist 3+ views LEFT   Final Result by Christie Arguello MD (07/17 2054)      No acute osseous abnormality  Workstation performed: [id number]         XR finger second digit-index RIGHT   Final Result by Christie Arguello MD (07/17 2050)      No fracture  Workstation performed: [id number]         XR ankle 3+ views LEFT   Final Result by Christie Arguello MD (07/17 1756)      Nondisplaced fracture of the distal fibula  The study was marked in [employer] for significant notification  Workstation performed: [id number]         XR stroke alert portable chest   Final Result by Christie Arguello MD (07/17 1714)      No acute cardiopulmonary disease  Workstation performed: [id number]         CTA stroke alert (head/neck)   Final Result by Noble Singh MD (07/17 1708)      No acute intracranial abnormality  No focal stenosis or saccular aneurysm within the [place]  No hemodynamically significant stenosis within either common or internal carotid artery  Less than 50% stenosis by NASCET criteria  Unremarkable bilateral vertebral arteries  Workstation performed: [id number]         CT stroke alert brain   Final Result by Noble Singh MD (07/17 1654)      No acute intracranial abnormality  Mild chronic small vessel ischemic changes and volume loss  Findings were directly discussed with Merary Pride on 7/17/2018 4:45 PM       Workstation performed: [id number]             Incidental Findings:   · None     Test Results Pending at Discharge (will require follow up): · None     Outpatient Tests Requested:  · None    Complications:  None    Hospital Course: Alba Celis is a 80 y o  female patient who originally presented to the hospital on 7/17/2018 due to fall   Patient was found on the ground and noted to have left-sided weakness and dysarthria  She was a stroke alert and received TPA  MRI confirmed acute CVA  She remains with LUE weakness  There was concern for possible cardioembolic etiology and she underwent loop recorder placement  She was also noted to have left fibular fracture and was placed in CAM boot  Condition at Discharge: stable     Discharge Day Visit / Exam:     Subjective:  Reports LUE pain  Vitals: Blood Pressure: 147/78 (07/22/18 0740)  Pulse: 73 (07/22/18 0740)  Temperature: 98 4 °F (36 9 °C) (07/22/18 0740)  Temp Source: Oral (07/22/18 0740)  Respirations: 18 (07/22/18 0740)  Height: 4' 11" (149 9 cm) (07/19/18 0100)  Weight - Scale: 51 3 kg (113 lb 1 5 oz) (07/19/18 0100)  SpO2: 91 % (07/22/18 0740)  Exam:   Physical Exam   Constitutional: No distress  HENT:   Head: Normocephalic and atraumatic  Neck: Normal range of motion  Neck supple  Cardiovascular: Normal rate, regular rhythm and normal heart sounds  Pulmonary/Chest: Effort normal and breath sounds normal  No respiratory distress  She has no wheezes  She has no rales  Abdominal: Soft  Bowel sounds are normal  She exhibits no distension  There is no tenderness  Musculoskeletal:   Left wrist, elbow, shoulder with normal ROM  LLE in CAM walker   Neurological: She is alert  Oriented to person, place  Oriented to month but not day or year  LUE strength 3/5   Skin: She is not diaphoretic  Multiple areas of ecchymoses- large ecchymosis of entire LUE  Mild edema of LUE   Psychiatric: She has a normal mood and affect  Goals of Care Discussions:  · Code Status at Discharge: Level 1 - Full Code  · Were there any Goals of Care Discussions during Hospitalization?: No  · Results of any General Goals of Care Discussions: None   · POLST Completed: No   · If POLST Completed, Summary of POLST Agreement Provided Here: None   · OK to Rehospitalize if Needed?  Yes    Discharge instructions/Information to patient and family:   See after visit summary section titled Discharge Instructions for information provided to patient and family  Planned Readmission: None      Discharge Statement:  I spent 45 minutes discharging the patient  This time was spent on the day of discharge  I had direct contact with the patient on the day of discharge  Greater than 50% of the total time was spent examining patient, answering all patient questions, arranging and discussing plan of care with patient as well as directly providing post-discharge instructions  Additional time then spent on discharge activities      ** Please Note: This note has been constructed using a voice recognition system **

## 2018-07-22 NOTE — ASSESSMENT & PLAN NOTE
· Ecchymosis s/p fall and TPA administration  · Left chin, LUE (shoulder to fingers), LLE  · Supportive care  · Elevate LUE

## 2018-07-26 ENCOUNTER — TELEPHONE (OUTPATIENT)
Dept: NEUROLOGY | Facility: CLINIC | Age: 83
End: 2018-07-26

## 2018-08-13 ENCOUNTER — APPOINTMENT (OUTPATIENT)
Dept: RADIOLOGY | Facility: CLINIC | Age: 83
End: 2018-08-13
Payer: COMMERCIAL

## 2018-08-13 ENCOUNTER — OFFICE VISIT (OUTPATIENT)
Dept: OBGYN CLINIC | Facility: CLINIC | Age: 83
End: 2018-08-13

## 2018-08-13 VITALS — DIASTOLIC BLOOD PRESSURE: 72 MMHG | SYSTOLIC BLOOD PRESSURE: 125 MMHG | HEART RATE: 66 BPM | HEIGHT: 59 IN

## 2018-08-13 DIAGNOSIS — S82.832D CLOSED FRACTURE OF DISTAL END OF LEFT FIBULA WITH ROUTINE HEALING, UNSPECIFIED FRACTURE MORPHOLOGY, SUBSEQUENT ENCOUNTER: Primary | ICD-10-CM

## 2018-08-13 DIAGNOSIS — T14.8XXA FRACTURE: ICD-10-CM

## 2018-08-13 PROCEDURE — 99024 POSTOP FOLLOW-UP VISIT: CPT | Performed by: ORTHOPAEDIC SURGERY

## 2018-08-13 PROCEDURE — 73590 X-RAY EXAM OF LOWER LEG: CPT

## 2018-08-13 RX ORDER — CITALOPRAM 10 MG/1
20 TABLET ORAL DAILY
COMMUNITY

## 2018-08-13 NOTE — PROGRESS NOTES
ASSESSMENT/PLAN:    Assessment:   Closed nondisplaced fracture to distal end of left fibula sustained on 7/17/18 treated conservatively     Plan:   The patient was advised to continue the use of her CAM boot except when she sleeps  She should continue therapy at the [facility] home with WBAT  Follow Up:  4  week(s)    To Do Next Visit:  X-rays of the  left  tib/fib    General Discussions:  Fracture - Nonoperative Care: The physiology of a fractured bone was discussed with the patient today  With nondisplaced or minimally displaced fractures, conservative treatment often results in a functional recovery  Typically, these fractures are immobilized in either a cast or splint depending on the pattern  Radiographs are typically taken at intervals throughout the fracture healing to ensure that muscular forces do not cause loss of reduction or alignment  If the fracture loses its alignment, surgical intervention may be required to stabilize it  Medical conditions such as diabetes, osteoporosis, vitamin D deficiency, and a history of or exposure to smoking may delay or prevent fracture healing  Operative Discussions:         _____________________________________________________  CHIEF COMPLAINT:  Chief Complaint   Patient presents with    Left Lower Leg - Fracture         SUBJECTIVE:  Jelani Singh is a 80y o  year old female who presents for follow up regarding Closed fracture to distal end of left fibula sustained on 7/17/18  Since last visit, Jelani Singh has tried CAM walker and PT at [facility] with only partial relief  Today there is Pain  Mild  Intermittant  Sharp and Aching to the left ankle  Pt is present in a stretcher today however she is WB with a walker which she tolerates well  She does c/o the boot being to heavy     Radiation: None  Associated symptoms: swelling to left ankle     PAST MEDICAL HISTORY:  Past Medical History:   Diagnosis Date    Arthritis     scoliosis       PAST SURGICAL HISTORY:  Past Surgical History:   Procedure Laterality Date    ABDOMINAL SURGERY      bowel surgery approx  2003    JOINT REPLACEMENT Left 2015    hip       FAMILY HISTORY:  Family History   Problem Relation Age of Onset    Family history unknown: Yes       SOCIAL HISTORY:  Social History   Substance Use Topics    Smoking status: Never Smoker    Smokeless tobacco: Never Used    Alcohol use No       MEDICATIONS:    Current Outpatient Prescriptions:     acetaminophen (TYLENOL) 500 mg tablet, Take 1,000 mg by mouth every 6 (six) hours as needed for mild pain, Disp: , Rfl:     aspirin (ECOTRIN LOW STRENGTH) 81 mg EC tablet, Take 1 tablet (81 mg total) by mouth daily, Disp: 30 tablet, Rfl: 0    atorvastatin (LIPITOR) 40 mg tablet, Take 1 tablet (40 mg total) by mouth every evening, Disp: 30 tablet, Rfl: 0    citalopram (CeleXA) 10 mg tablet, Take 10 mg by mouth daily, Disp: , Rfl:     nystatin (MYCOSTATIN) powder, Apply topically 2 (two) times a day, Disp: 15 g, Rfl: 0    ALLERGIES:  No Known Allergies    REVIEW OF SYSTEMS:  Pertinent items are noted in HPI      LABS:  HgA1c:   Lab Results   Component Value Date    HGBA1C 5 7 07/18/2018     BMP:   Lab Results   Component Value Date    GLUCOSE 94 07/21/2018    CALCIUM 8 2 (L) 07/21/2018     07/21/2018    K 4 2 07/21/2018    CO2 28 07/21/2018     (H) 07/21/2018    BUN 26 (H) 07/21/2018    CREATININE 0 53 (L) 07/21/2018           _____________________________________________________  PHYSICAL EXAMINATION:  General: well developed and well nourished, alert, oriented times 3 and appears comfortable  Psychiatric: Normal  HEENT: Trachea Midline, No torticollis  Cardiovascular: No discernable arrhythmia  Pulmonary: No wheezing or stridor  Skin: swelling present to left ankle however improved ecchymosis noted  Neurovascular: Sensation Intact to the Median, Ulnar, Radial Nerve, Motor Intact to the Median, Ulnar, Radial Nerve and Pulses Intact    MUSCULOSKELETAL EXAMINATION:  LEFT SIDE:  ankle: tenderness to lateral mallelous and distal fibula, patient is able to dorsiflex and plantarflex her ankle, NVI, calves are soft     _____________________________________________________  STUDIES REVIEWED:  Images were reviewd in PACS: nondisplaced distal fibula fracture with proper alignment      PROCEDURES PERFORMED:  Procedures  No Procedures performed today   Scribe Attestation    I,:   Avery Feng am acting as a scribe while in the presence of the attending physician :        I,:   Jones Gomes MD personally performed the services described in this documentation    as scribed in my presence :

## 2018-08-23 ENCOUNTER — OFFICE VISIT (OUTPATIENT)
Dept: CARDIOLOGY CLINIC | Facility: CLINIC | Age: 83
End: 2018-08-23
Payer: MEDICARE

## 2018-08-23 ENCOUNTER — IN-CLINIC DEVICE VISIT (OUTPATIENT)
Dept: CARDIOLOGY CLINIC | Facility: CLINIC | Age: 83
End: 2018-08-23
Payer: MEDICARE

## 2018-08-23 VITALS — DIASTOLIC BLOOD PRESSURE: 68 MMHG | HEART RATE: 57 BPM | SYSTOLIC BLOOD PRESSURE: 120 MMHG | HEIGHT: 59 IN

## 2018-08-23 DIAGNOSIS — I63.9 CRYPTOGENIC STROKE (HCC): Primary | ICD-10-CM

## 2018-08-23 DIAGNOSIS — I63.9 CEREBROVASCULAR ACCIDENT (CVA), UNSPECIFIED MECHANISM (HCC): Primary | ICD-10-CM

## 2018-08-23 DIAGNOSIS — Z95.818 PRESENCE OF OTHER CARDIAC IMPLANTS AND GRAFTS: ICD-10-CM

## 2018-08-23 DIAGNOSIS — I63.9 ACUTE CVA (CEREBROVASCULAR ACCIDENT) (HCC): ICD-10-CM

## 2018-08-23 PROCEDURE — 99024 POSTOP FOLLOW-UP VISIT: CPT | Performed by: INTERNAL MEDICINE

## 2018-08-23 PROCEDURE — 93010 ELECTROCARDIOGRAM REPORT: CPT | Performed by: INTERNAL MEDICINE

## 2018-08-23 RX ORDER — POLYETHYLENE GLYCOL 3350 17 G/17G
17 POWDER, FOR SOLUTION ORAL DAILY
COMMUNITY

## 2018-08-23 NOTE — PROGRESS NOTES
MDT LOOP MONITOR -   DEVICE INTERROGATED IN THE [place] OFFICE  PT SEEN IN OFFICE FOR CARDIAC FUP WITH  DR ANGEL  WOUND CHECK: INCISION CLEAN AND DRY WITH EDGES APPROXIMATED  PRESENTING HR - SB @ ~ 54 BPM  R WVS @ 0 53 mV  2 DEVICE DETECTED BRADYCARDIA EPISODES  NO OTHER DEVICE ACTIVATED EPISODES OR PATIENT ACTIVATED EPISODES   TACHY DETECT RATE DECREASED  BPM PER DR ANGEL  NORMAL DEVICE FUNCTION   eb

## 2018-08-23 NOTE — LETTER
August 23, 2018     Cash Paulson MD  [facility]  [id number] [place] [address] Drive [address]    Patient: Meg Contreras   YOB: 1934   Date of Visit: 8/23/2018       Dear Dr Jared Pearson:    Thank you for referring oHlly Forman to me for evaluation  Below are my notes for this consultation  If you have questions, please do not hesitate to call me  I look forward to following your patient along with you  Sincerely,        Alex Campos MD        CC: Chris Connell MD  8/23/2018  9:40 AM  Sign at close encounter                                             Cardiology Follow Up    Meg Contreras  1934  [id number]  [address]  [address] [address]  [phone]    1  Cerebrovascular accident (CVA), unspecified mechanism ([facility] [address] )  POCT ECG   2  Acute CVA (cerebrovascular accident) ([address] )         Interval History:     Patient is here post loop implantation  She had a CVA, sustained a fall, and fractured her left lower limb     The patient is not complaining of anginal like chest pain or chest pressure  There is no worsening orthopnea, paroxysmal nocturnal dyspnea  There is no leg swelling    Patient does not get palpitation   There is no history of presyncope or syncope  There is rare history of transient  lightheadedness  She is limited to a wheel chair      Patient Active Problem List   Diagnosis    Acute CVA (cerebrovascular accident) ([address] )    Fall    Ataxia    Closed fracture of distal end of left fibula    Ecchymosis s/p fall    Cerebrovascular accident (CVA) ([facility] [address] )     Past Medical History:   Diagnosis Date    Arthritis     scoliosis     Social History     Social History    Marital status: Single     Spouse name: N/A    Number of children: N/A    Years of education: N/A     Occupational History    Not on file       Social History Main Topics    Smoking status: Never Smoker    Smokeless tobacco: Never Used   Deya Sicks Alcohol use No    Drug use: No    Sexual activity: No     Other Topics Concern    Not on file     Social History Narrative    No narrative on file      Family History   Problem Relation Age of Onset    Family history unknown: Yes     Past Surgical History:   Procedure Laterality Date    ABDOMINAL SURGERY      bowel surgery approx   2003    JOINT REPLACEMENT Left 2015    hip       Current Outpatient Prescriptions:     acetaminophen (TYLENOL) 500 mg tablet, Take 1,000 mg by mouth every 6 (six) hours as needed for mild pain, Disp: , Rfl:     aspirin (ECOTRIN LOW STRENGTH) 81 mg EC tablet, Take 1 tablet (81 mg total) by mouth daily, Disp: 30 tablet, Rfl: 0    atorvastatin (LIPITOR) 40 mg tablet, Take 1 tablet (40 mg total) by mouth every evening, Disp: 30 tablet, Rfl: 0    citalopram (CeleXA) 10 mg tablet, Take 20 mg by mouth daily  , Disp: , Rfl:     polyethylene glycol (MIRALAX) 17 g packet, Take 17 g by mouth daily, Disp: , Rfl:   No Known Allergies    Labs:  Lab Results   Component Value Date     07/21/2018     12/06/2015    K 4 2 07/21/2018    K 3 6 12/06/2015     (H) 07/21/2018     12/06/2015    CO2 28 07/21/2018    CO2 25 8 12/06/2015    BUN 26 (H) 07/21/2018    BUN 15 12/06/2015    CREATININE 0 53 (L) 07/21/2018    CREATININE 0 58 (L) 12/06/2015    GLUCOSE 94 07/21/2018    GLUCOSE 152 (H) 07/17/2018    GLUCOSE 116 12/06/2015    CALCIUM 8 2 (L) 07/21/2018    CALCIUM 8 3 12/06/2015     Lab Results   Component Value Date    TROPONINI 0 02 07/17/2018     Lab Results   Component Value Date    WBC 8 37 07/20/2018    WBC 6 57 12/07/2015    HGB 10 8 (L) 07/20/2018    HGB 9 5 (L) 12/07/2015    HCT 34 5 (L) 07/20/2018    HCT 28 9 (L) 12/07/2015    MCV 95 07/20/2018    MCV 94 12/07/2015     07/20/2018     (L) 12/07/2015     Lab Results   Component Value Date    TRIG 61 07/18/2018    HDL 46 07/18/2018         Imaging: Xr Tibia Fibula 2 Vw Left  Result Date: 8/15/2018  Narrative: LEFT TIBIA AND FIBULA INDICATION:   T14  8XXA: Other injury of unspecified body region, initial encounter  Decreasing pain status post left tib-fib fracture  COMPARISON:  July 17, 2018 VIEWS:  XR TIBIA FIBULA 2 VW LEFT FINDINGS: Bones are osteopenic  Healing distal fibular fracture noted with bony bridging  Fracture line no longer visible  Calcaneal spur(s) noted  No lytic or blastic lesions are seen  Soft tissues are unremarkable  Impression: Disuse osteopenia  Fracture line distal fibula no longer visible  Workstation performed: [id number]       Review of Systems:  Review of Systems   All other systems reviewed and are negative  as described in my history of present illness        Physical Exam:  Physical Exam   Constitutional: She is oriented to person, place, and time  She appears well-developed and well-nourished  No distress  Not in any distress at the current time  In wheel chair  Has brace over left leg  Left side hemiparesis , leg> arm   HENT:   Head: Normocephalic and atraumatic  Right Ear: External ear normal    Left Ear: External ear normal    Nose: Nose normal    Mouth/Throat: Uvula is midline and mucous membranes are normal    Eyes: Conjunctivae, EOM and lids are normal  Pupils are equal, round, and reactive to light  No scleral icterus  No pallor  No cyanosis  No icterus   Neck: Trachea normal and normal range of motion  Neck supple  No JVD present  Carotid bruit is not present  No thyromegaly present  No jugular lymphadenopathy   Cardiovascular: Normal rate, regular rhythm, S1 normal, S2 normal, normal heart sounds, intact distal pulses and normal pulses  PMI is not displaced  Exam reveals no gallop, no S3, no S4 and no friction rub  No murmur heard  Pulmonary/Chest: Effort normal  No accessory muscle usage  No respiratory distress  She has decreased breath sounds in the right lower field and the left lower field  She has no wheezes  She has no rhonchi   She has no rales  She exhibits no tenderness  Significant kypho-scoliosis   Abdominal: Soft  Normal appearance and bowel sounds are normal  She exhibits no distension and no mass  There is no splenomegaly or hepatomegaly  There is no tenderness  Musculoskeletal: She exhibits no edema or tenderness  Left hemiparesis , leg > arm  Brace over left leg  In wheel chair   Lymphadenopathy:     She has no cervical adenopathy  Neurological: She is alert and oriented to person, place, and time  Facial symmetry is retained  Extraocular movements are retained  Head neck tongue and palate movement are retained and symmetric    Left hemiparesis , leg > arm  Brace over left leg   Skin: Skin is intact  No abrasion, no lesion and no rash noted  No erythema  Nails show no clubbing  Psychiatric: She has a normal mood and affect  Her speech is normal and behavior is normal  Thought content normal        Discussion/Summary:    1   CVA    Has loop for cryptogenic stroke   Loop interrogated and no A fib noted  Made more sensitive with all fib and upper rate >120/m    Follow up with PA as needed   Will continue to monitor

## 2018-08-23 NOTE — PROGRESS NOTES
Cardiology Follow Up    Johnson Vela  1934  [id number]  [address] [facility] [address] [place] [address]  [phone]    1  Cerebrovascular accident (CVA), unspecified mechanism ([facility] [address] )  POCT ECG   2  Acute CVA (cerebrovascular accident) ([facility] )         Interval History:     Patient is here post loop implantation  She had a CVA, sustained a fall, and fractured her left lower limb     The patient is not complaining of anginal like chest pain or chest pressure  There is no worsening orthopnea, paroxysmal nocturnal dyspnea  There is no leg swelling    Patient does not get palpitation   There is no history of presyncope or syncope  There is rare history of transient  lightheadedness  She is limited to a wheel chair      Patient Active Problem List   Diagnosis    Acute CVA (cerebrovascular accident) ([facility] [address] )    Fall    Ataxia    Closed fracture of distal end of left fibula    Ecchymosis s/p fall    Cerebrovascular accident (CVA) ([facility] )     Past Medical History:   Diagnosis Date    Arthritis     scoliosis     Social History     Social History    Marital status: Single     Spouse name: N/A    Number of children: N/A    Years of education: N/A     Occupational History    Not on file  Social History Main Topics    Smoking status: Never Smoker    Smokeless tobacco: Never Used    Alcohol use No    Drug use: No    Sexual activity: No     Other Topics Concern    Not on file     Social History Narrative    No narrative on file      Family History   Problem Relation Age of Onset    Family history unknown: Yes     Past Surgical History:   Procedure Laterality Date    ABDOMINAL SURGERY      bowel surgery approx   2003    JOINT REPLACEMENT Left 2015    hip       Current Outpatient Prescriptions:     acetaminophen (TYLENOL) 500 mg tablet, Take 1,000 mg by mouth every 6 (six) hours as needed for mild pain, Disp: , Rfl:     aspirin (ECOTRIN LOW STRENGTH) 81 mg EC tablet, Take 1 tablet (81 mg total) by mouth daily, Disp: 30 tablet, Rfl: 0    atorvastatin (LIPITOR) 40 mg tablet, Take 1 tablet (40 mg total) by mouth every evening, Disp: 30 tablet, Rfl: 0    citalopram (CeleXA) 10 mg tablet, Take 20 mg by mouth daily  , Disp: , Rfl:     polyethylene glycol (MIRALAX) 17 g packet, Take 17 g by mouth daily, Disp: , Rfl:   No Known Allergies    Labs:  Lab Results   Component Value Date     07/21/2018     12/06/2015    K 4 2 07/21/2018    K 3 6 12/06/2015     (H) 07/21/2018     12/06/2015    CO2 28 07/21/2018    CO2 25 8 12/06/2015    BUN 26 (H) 07/21/2018    BUN 15 12/06/2015    CREATININE 0 53 (L) 07/21/2018    CREATININE 0 58 (L) 12/06/2015    GLUCOSE 94 07/21/2018    GLUCOSE 152 (H) 07/17/2018    GLUCOSE 116 12/06/2015    CALCIUM 8 2 (L) 07/21/2018    CALCIUM 8 3 12/06/2015     Lab Results   Component Value Date    TROPONINI 0 02 07/17/2018     Lab Results   Component Value Date    WBC 8 37 07/20/2018    WBC 6 57 12/07/2015    HGB 10 8 (L) 07/20/2018    HGB 9 5 (L) 12/07/2015    HCT 34 5 (L) 07/20/2018    HCT 28 9 (L) 12/07/2015    MCV 95 07/20/2018    MCV 94 12/07/2015     07/20/2018     (L) 12/07/2015     Lab Results   Component Value Date    TRIG 61 07/18/2018    HDL 46 07/18/2018         Imaging: Xr Tibia Fibula 2 Vw Left  Result Date: 8/15/2018  Narrative: LEFT TIBIA AND FIBULA INDICATION:   T14  8XXA: Other injury of unspecified body region, initial encounter  Decreasing pain status post left tib-fib fracture  COMPARISON:  July 17, 2018 VIEWS:  XR TIBIA FIBULA 2 VW LEFT FINDINGS: Bones are osteopenic  Healing distal fibular fracture noted with bony bridging  Fracture line no longer visible  Calcaneal spur(s) noted  No lytic or blastic lesions are seen  Soft tissues are unremarkable  Impression: Disuse osteopenia  Fracture line distal fibula no longer visible   Workstation performed: [id number]       Review of Systems:  Review of Systems   All other systems reviewed and are negative  as described in my history of present illness        Physical Exam:  Physical Exam   Constitutional: She is oriented to person, place, and time  She appears well-developed and well-nourished  No distress  Not in any distress at the current time  In wheel chair  Has brace over left leg  Left side hemiparesis , leg> arm   HENT:   Head: Normocephalic and atraumatic  Right Ear: External ear normal    Left Ear: External ear normal    Nose: Nose normal    Mouth/Throat: Uvula is midline and mucous membranes are normal    Eyes: Conjunctivae, EOM and lids are normal  Pupils are equal, round, and reactive to light  No scleral icterus  No pallor  No cyanosis  No icterus   Neck: Trachea normal and normal range of motion  Neck supple  No JVD present  Carotid bruit is not present  No thyromegaly present  No jugular lymphadenopathy   Cardiovascular: Normal rate, regular rhythm, S1 normal, S2 normal, normal heart sounds, intact distal pulses and normal pulses  PMI is not displaced  Exam reveals no gallop, no S3, no S4 and no friction rub  No murmur heard  Pulmonary/Chest: Effort normal  No accessory muscle usage  No respiratory distress  She has decreased breath sounds in the right lower field and the left lower field  She has no wheezes  She has no rhonchi  She has no rales  She exhibits no tenderness  Significant kypho-scoliosis   Abdominal: Soft  Normal appearance and bowel sounds are normal  She exhibits no distension and no mass  There is no splenomegaly or hepatomegaly  There is no tenderness  Musculoskeletal: She exhibits no edema or tenderness  Left hemiparesis , leg > arm  Brace over left leg  In wheel chair   Lymphadenopathy:     She has no cervical adenopathy  Neurological: She is alert and oriented to person, place, and time     Facial symmetry is retained  Extraocular movements are retained  Head neck tongue and palate movement are retained and symmetric    Left hemiparesis , leg > arm  Brace over left leg   Skin: Skin is intact  No abrasion, no lesion and no rash noted  No erythema  Nails show no clubbing  Psychiatric: She has a normal mood and affect  Her speech is normal and behavior is normal  Thought content normal        Discussion/Summary:    1   CVA    Has loop for cryptogenic stroke   Loop interrogated and no A fib noted  Made more sensitive with all fib and upper rate >120/m    Follow up with PA as needed   Will continue to monitor

## 2018-09-10 ENCOUNTER — OFFICE VISIT (OUTPATIENT)
Dept: OBGYN CLINIC | Facility: CLINIC | Age: 83
End: 2018-09-10

## 2018-09-10 ENCOUNTER — APPOINTMENT (OUTPATIENT)
Dept: RADIOLOGY | Facility: CLINIC | Age: 83
End: 2018-09-10
Payer: COMMERCIAL

## 2018-09-10 VITALS — HEIGHT: 59 IN | SYSTOLIC BLOOD PRESSURE: 166 MMHG | HEART RATE: 49 BPM | DIASTOLIC BLOOD PRESSURE: 72 MMHG

## 2018-09-10 DIAGNOSIS — S82.832A CLOSED FRACTURE OF DISTAL END OF LEFT FIBULA, UNSPECIFIED FRACTURE MORPHOLOGY, INITIAL ENCOUNTER: Primary | ICD-10-CM

## 2018-09-10 DIAGNOSIS — T14.8XXA FRACTURE: ICD-10-CM

## 2018-09-10 PROCEDURE — 99024 POSTOP FOLLOW-UP VISIT: CPT | Performed by: ORTHOPAEDIC SURGERY

## 2018-09-10 PROCEDURE — 73590 X-RAY EXAM OF LOWER LEG: CPT

## 2018-09-10 NOTE — PROGRESS NOTES
ASSESSMENT/PLAN:    Assessment:   Closed nondisplaced fracture to distal end of left fibula sustained on 7/17/18 treated conservatively     Plan:   Patient may dx use of CAM boot at this time, she may continue physical therapy for gait and balance training, WBAT with an assistant device as needed  She may return to normal shoe wear at this time   Follow Up:  2  month(s)    To Do Next Visit:  X-rays of the  left  tib/fib    General Discussions:  Fracture - Nonoperative Care: The physiology of a fractured bone was discussed with the patient today  With nondisplaced or minimally displaced fractures, conservative treatment often results in a functional recovery  Typically, these fractures are immobilized in either a cast or splint depending on the pattern  Radiographs are typically taken at intervals throughout the fracture healing to ensure that muscular forces do not cause loss of reduction or alignment  If the fracture loses its alignment, surgical intervention may be required to stabilize it  Medical conditions such as diabetes, osteoporosis, vitamin D deficiency, and a history of or exposure to smoking may delay or prevent fracture healing  Operative Discussions:         _____________________________________________________  CHIEF COMPLAINT:  Chief Complaint   Patient presents with    Left Lower Leg - Follow-up         SUBJECTIVE:  Amaya Jaems is a 80y o  year old female who presents for follow up regarding Closed nondisplaced fracture to distal end of left fibula sustained on 7/17/18 treated conservatively  Since last visit, Amaya James has tried wearing the CAM boot at all times except when sleeping, therapy with WBAT with relief  Today there is Pain  Mild  Intermittant  Aching to the left ankle      Radiation: None  Associated symptoms: No Complaints    PAST MEDICAL HISTORY:  Past Medical History:   Diagnosis Date    Arthritis     scoliosis       PAST SURGICAL HISTORY:  Past Surgical History:   Procedure Laterality Date    ABDOMINAL SURGERY      bowel surgery approx  2003    JOINT REPLACEMENT Left 2015    hip       FAMILY HISTORY:  Family History   Problem Relation Age of Onset    Family history unknown: Yes       SOCIAL HISTORY:  Social History   Substance Use Topics    Smoking status: Never Smoker    Smokeless tobacco: Never Used    Alcohol use No       MEDICATIONS:    Current Outpatient Prescriptions:     acetaminophen (TYLENOL) 500 mg tablet, Take 1,000 mg by mouth every 6 (six) hours as needed for mild pain, Disp: , Rfl:     aspirin (ECOTRIN LOW STRENGTH) 81 mg EC tablet, Take 1 tablet (81 mg total) by mouth daily, Disp: 30 tablet, Rfl: 0    atorvastatin (LIPITOR) 40 mg tablet, Take 1 tablet (40 mg total) by mouth every evening, Disp: 30 tablet, Rfl: 0    citalopram (CeleXA) 10 mg tablet, Take 20 mg by mouth daily  , Disp: , Rfl:     polyethylene glycol (MIRALAX) 17 g packet, Take 17 g by mouth daily, Disp: , Rfl:     ALLERGIES:  No Known Allergies    REVIEW OF SYSTEMS:  Pertinent items are noted in HPI      LABS:  HgA1c:   Lab Results   Component Value Date    HGBA1C 5 7 07/18/2018     BMP:   Lab Results   Component Value Date    GLUCOSE 152 (H) 07/17/2018    CALCIUM 8 2 (L) 07/21/2018     07/21/2018    K 4 2 07/21/2018    CO2 28 07/21/2018     (H) 07/21/2018    BUN 26 (H) 07/21/2018    CREATININE 0 53 (L) 07/21/2018           _____________________________________________________  PHYSICAL EXAMINATION:  General: well developed and well nourished, alert, oriented times 3 and appears comfortable  Psychiatric: Normal  HEENT: Trachea Midline, No torticollis  Cardiovascular: No discernable arrhythmia  Pulmonary: No wheezing or stridor  Skin: No masses, erthema, lacerations, fluctation, ulcerations  Neurovascular: Sensation Intact to the Median, Ulnar, Radial Nerve, Motor Intact to the Median, Ulnar, Radial Nerve and Pulses Intact    MUSCULOSKELETAL EXAMINATION:  LEFT SIDE: LE:   Left Foot & Ankle Exam  Alignment:  Normal ankle alignment  Inspection:  No swelling  Palpation:  No tenderness at fracture site or peroneal tendons  ROM:  full ankle ROM  Strength:  Not tested  Stability:  (-) Anterior  neutral  (-) Anterior  PF  (-) Talar Tilt  Tests:  No pertinent positive or negative tests  Neurovascular:  Sensation intact in DP/SP/Hong/Sa/T nerve distributions  2+ DP & PT pulses    Gait:  Not tested   _____________________________________________________  STUDIES REVIEWED:  Images were reviewd in PACS: healed distal fibula fracture with callus formation covering 3 of 4 cortises on AP and lateral views, with proper alignment       PROCEDURES PERFORMED:  Procedures  No Procedures performed today   Scribe Attestation    I,:   Nasir Feng am acting as a scribe while in the presence of the attending physician :        I,:   Alex Barajas MD personally performed the services described in this documentation    as scribed in my presence :

## 2018-11-12 ENCOUNTER — APPOINTMENT (OUTPATIENT)
Dept: RADIOLOGY | Facility: CLINIC | Age: 83
End: 2018-11-12
Payer: MEDICARE

## 2018-11-12 ENCOUNTER — OFFICE VISIT (OUTPATIENT)
Dept: OBGYN CLINIC | Facility: CLINIC | Age: 83
End: 2018-11-12
Payer: MEDICARE

## 2018-11-12 VITALS
HEIGHT: 59 IN | DIASTOLIC BLOOD PRESSURE: 80 MMHG | HEART RATE: 61 BPM | SYSTOLIC BLOOD PRESSURE: 136 MMHG | BODY MASS INDEX: 22.84 KG/M2

## 2018-11-12 DIAGNOSIS — S82.832D CLOSED FRACTURE OF DISTAL END OF LEFT FIBULA WITH ROUTINE HEALING, UNSPECIFIED FRACTURE MORPHOLOGY, SUBSEQUENT ENCOUNTER: Primary | ICD-10-CM

## 2018-11-12 DIAGNOSIS — T14.8XXA FRACTURE: ICD-10-CM

## 2018-11-12 PROCEDURE — 73590 X-RAY EXAM OF LOWER LEG: CPT

## 2018-11-12 PROCEDURE — 99213 OFFICE O/P EST LOW 20 MIN: CPT | Performed by: ORTHOPAEDIC SURGERY

## 2018-11-12 NOTE — PROGRESS NOTES
ASSESSMENT/PLAN:    Assessment:   Closed nondisplaced fracture to distal end of left fibula sustained on 7/17/18 treated conservatively     Plan:   Patient has no formal restrictions at this time, WBAT  Follow Up:  PRN    To Do Next Visit:       General Discussions:     Fracture - Nonoperative Care: The physiology of a fractured bone was discussed with the patient today  With nondisplaced or minimally displaced fractures, conservative treatment often results in a functional recovery  Typically, these fractures are immobilized in either a cast or splint depending on the pattern  Radiographs are typically taken at intervals throughout the fracture healing to ensure that muscular forces do not cause loss of reduction or alignment  If the fracture loses its alignment, surgical intervention may be required to stabilize it  Medical conditions such as diabetes, osteoporosis, vitamin D deficiency, and a history of or exposure to smoking may delay or prevent fracture healing  Operative Discussions:         _____________________________________________________  CHIEF COMPLAINT:  Chief Complaint   Patient presents with    Left Lower Leg - Follow-up         SUBJECTIVE:  Constantino Whipple is a 80y o  year old female who presents for follow up regarding Closed nondisplaced fracture to distal end of left fibula sustained on 7/17/18 treated conservatively  Since last visit, Constantino Whipple has tried physical therapy for gait and balance training, WBAT and resuming to a normal shoe with relief  Today there is No Complaints to the left ankle  Radiation: None  Associated symptoms: No Complaints    PAST MEDICAL HISTORY:  Past Medical History:   Diagnosis Date    Arthritis     scoliosis       PAST SURGICAL HISTORY:  Past Surgical History:   Procedure Laterality Date    ABDOMINAL SURGERY      bowel surgery approx   2003    JOINT REPLACEMENT Left 2015    hip       FAMILY HISTORY:  Family History   Problem Relation Age of Onset    Family history unknown: Yes       SOCIAL HISTORY:  Social History   Substance Use Topics    Smoking status: Never Smoker    Smokeless tobacco: Never Used    Alcohol use No       MEDICATIONS:    Current Outpatient Prescriptions:     acetaminophen (TYLENOL) 500 mg tablet, Take 1,000 mg by mouth every 6 (six) hours as needed for mild pain, Disp: , Rfl:     aspirin (ECOTRIN LOW STRENGTH) 81 mg EC tablet, Take 1 tablet (81 mg total) by mouth daily, Disp: 30 tablet, Rfl: 0    atorvastatin (LIPITOR) 40 mg tablet, Take 1 tablet (40 mg total) by mouth every evening, Disp: 30 tablet, Rfl: 0    citalopram (CeleXA) 10 mg tablet, Take 20 mg by mouth daily  , Disp: , Rfl:     polyethylene glycol (MIRALAX) 17 g packet, Take 17 g by mouth daily, Disp: , Rfl:     ALLERGIES:  No Known Allergies    REVIEW OF SYSTEMS:  Pertinent items are noted in HPI  LABS:  HgA1c:   Lab Results   Component Value Date    HGBA1C 5 7 07/18/2018     BMP:   Lab Results   Component Value Date    GLUCOSE 152 (H) 07/17/2018    CALCIUM 8 2 (L) 07/21/2018     12/06/2015    K 4 2 07/21/2018    CO2 28 07/21/2018     (H) 07/21/2018    BUN 26 (H) 07/21/2018    CREATININE 0 53 (L) 07/21/2018           _____________________________________________________  PHYSICAL EXAMINATION:  General: well developed and well nourished, alert, oriented times 3 and appears comfortable  Psychiatric: Normal  HEENT: Trachea Midline, No torticollis  Cardiovascular: No discernable arrhythmia  Pulmonary: No wheezing or stridor  Skin: No masses, erthema, lacerations, fluctation, ulcerations  Neurovascular: Sensation intact in DP/SP/Hong/Sa/T nerve distributions  2+ DP & PT pulses      MUSCULOSKELETAL EXAMINATION:  LEFT SIDE:  full ankle ROM, no swelling when compared bilaterally, no tenderness to distal fibula, peroneal tendons, tibial talar joint      _____________________________________________________  STUDIES REVIEWED:  Images were reviewd in PACS: xray of left tib/fib on 11/12/18 shows healed left distal fibula fracture with proper alignment       PROCEDURES PERFORMED:  Procedures  No Procedures performed today   Scribe Attestation    I,:   Elisha Joiner am acting as a scribe while in the presence of the attending physician :        I,:   Blanka Duron MD personally performed the services described in this documentation    as scribed in my presence :

## 2019-01-02 ENCOUNTER — REMOTE DEVICE CLINIC VISIT (OUTPATIENT)
Dept: CARDIOLOGY CLINIC | Facility: CLINIC | Age: 84
End: 2019-01-02
Payer: MEDICARE

## 2019-01-02 DIAGNOSIS — Z95.818 PRESENCE OF CARDIAC DEVICE: ICD-10-CM

## 2019-01-02 DIAGNOSIS — Z86.73 PERSONAL HISTORY OF TIA (TRANSIENT ISCHEMIC ATTACK): Primary | ICD-10-CM

## 2019-01-02 PROCEDURE — 93298 REM INTERROG DEV EVAL SCRMS: CPT | Performed by: INTERNAL MEDICINE

## 2019-01-02 PROCEDURE — 93299 PR REM INTERROG ICPMS/SCRMS <30 D TECH REVIEW: CPT | Performed by: INTERNAL MEDICINE

## 2019-01-02 NOTE — PROGRESS NOTES
Results for orders placed or performed in visit on 01/02/19   Cardiac EP device report    Narrative    CARELINK TRANSMISSION: BATTERY STATUS "OK"  9 GENESIS EPISODES DETECTED MIN 39 BPM  HISTORY OF THE SAME  NO PATIENT ACTIVATED EPISODES  ---MONACO

## 2019-04-09 ENCOUNTER — REMOTE DEVICE CLINIC VISIT (OUTPATIENT)
Dept: CARDIOLOGY CLINIC | Facility: CLINIC | Age: 84
End: 2019-04-09
Payer: MEDICARE

## 2019-04-09 DIAGNOSIS — Z95.818 PRESENCE OF OTHER CARDIAC IMPLANTS AND GRAFTS: ICD-10-CM

## 2019-04-09 DIAGNOSIS — I63.9 CRYPTOGENIC STROKE (HCC): Primary | ICD-10-CM

## 2019-04-09 PROCEDURE — 93299 PR REM INTERROG ICPMS/SCRMS <30 D TECH REVIEW: CPT | Performed by: INTERNAL MEDICINE

## 2019-04-09 PROCEDURE — 93298 REM INTERROG DEV EVAL SCRMS: CPT | Performed by: INTERNAL MEDICINE

## 2019-07-31 ENCOUNTER — REMOTE DEVICE CLINIC VISIT (OUTPATIENT)
Dept: CARDIOLOGY CLINIC | Facility: CLINIC | Age: 84
End: 2019-07-31
Payer: MEDICARE

## 2019-07-31 DIAGNOSIS — Z86.73 PERSONAL HISTORY OF TRANSIENT ISCHEMIC ATTACK: Primary | ICD-10-CM

## 2019-07-31 DIAGNOSIS — Z95.818 PRESENCE OF OTHER CARDIAC IMPLANTS AND GRAFTS: ICD-10-CM

## 2019-07-31 PROCEDURE — 93298 REM INTERROG DEV EVAL SCRMS: CPT | Performed by: INTERNAL MEDICINE

## 2019-07-31 PROCEDURE — 93299 PR REM INTERROG ICPMS/SCRMS <30 D TECH REVIEW: CPT | Performed by: INTERNAL MEDICINE

## 2019-07-31 NOTE — PROGRESS NOTES
MDT LOOP    CARELINK TRANSMISSION: LOOP RECORDER  PRESENTING RHYTHM NSR @ 63 BPM  BATTERY STATUS "OK"  [address] PVCs @ 39-58 BPM WHILE SLEEPING [HX OF SAME]  6 AF EPISODES W/ EGRAM SUGGESTING SR W/ PACs HOWEVER CAN NOT R/O AF DUE TO NOISE  AF BURDEN = 0%  NO PATIENT ACTIVATED EPISODES  NORMAL DEVICE FUNCTION   DL

## 2019-10-08 ENCOUNTER — REMOTE DEVICE CLINIC VISIT (OUTPATIENT)
Dept: CARDIOLOGY CLINIC | Facility: CLINIC | Age: 84
End: 2019-10-08
Payer: MEDICARE

## 2019-10-08 DIAGNOSIS — Z95.818 PRESENCE OF OTHER CARDIAC IMPLANTS AND GRAFTS: ICD-10-CM

## 2019-10-08 DIAGNOSIS — Z86.73 PERSONAL HISTORY OF TRANSIENT ISCHEMIC ATTACK: Primary | ICD-10-CM

## 2019-10-08 PROCEDURE — 93299 PR REM INTERROG ICPMS/SCRMS <30 D TECH REVIEW: CPT | Performed by: INTERNAL MEDICINE

## 2019-10-08 PROCEDURE — 93298 REM INTERROG DEV EVAL SCRMS: CPT | Performed by: INTERNAL MEDICINE

## 2019-10-08 NOTE — PROGRESS NOTES
MDT LOOP    CARELINK TRANSMISSION: LOOP RECORDER  PRESENTING RHYTHM NSR @ 67 BPM  BATTERY STATUS "OK"  21 GENESIS EPISODES W/ EGRAMS SHOWING SB @ 43-58 BPM  MOSTLY WHILE SLEEPING  HX OF SAME  NO PATIENT ACTIVATED EPISODES  NORMAL DEVICE FUNCTION   DL

## 2020-01-29 ENCOUNTER — REMOTE DEVICE CLINIC VISIT (OUTPATIENT)
Dept: CARDIOLOGY CLINIC | Facility: CLINIC | Age: 85
End: 2020-01-29
Payer: MEDICARE

## 2020-01-29 DIAGNOSIS — Z95.818 PRESENCE OF OTHER CARDIAC IMPLANTS AND GRAFTS: Primary | ICD-10-CM

## 2020-01-29 PROCEDURE — G2066 INTER DEVC REMOTE 30D: HCPCS | Performed by: INTERNAL MEDICINE

## 2020-01-29 PROCEDURE — 93298 REM INTERROG DEV EVAL SCRMS: CPT | Performed by: INTERNAL MEDICINE

## 2020-01-29 NOTE — PROGRESS NOTES
MDT LOOP    CARELINK TRANSMISSION: LOOP RECORDER  PRESENTING RHYTHM VS @ 61 BPM  BATTERY STATUS "OK"  1 TACHY EPISODE W/ EGRAM SHOWING SR W/ OVERSENSING  [address] SB @ 44-61 BPM  PT LIVES IN NURSING FACILITY AND IS INACTIVE MOST OF THE TIME  NO PATIENT ACTIVATED EPISODES  NORMAL DEVICE FUNCTION   DL

## 2022-07-13 ENCOUNTER — NURSING HOME VISIT (OUTPATIENT)
Dept: FAMILY MEDICINE CLINIC | Facility: CLINIC | Age: 87
End: 2022-07-13
Payer: MEDICARE

## 2022-07-13 DIAGNOSIS — E78.2 MIXED HYPERLIPIDEMIA: ICD-10-CM

## 2022-07-13 DIAGNOSIS — D50.8 IRON DEFICIENCY ANEMIA SECONDARY TO INADEQUATE DIETARY IRON INTAKE: ICD-10-CM

## 2022-07-13 DIAGNOSIS — F32.5 MAJOR DEPRESSIVE DISORDER WITH SINGLE EPISODE, IN FULL REMISSION (HCC): ICD-10-CM

## 2022-07-13 DIAGNOSIS — I69.354 HEMIPARESIS AFFECTING LEFT SIDE AS LATE EFFECT OF CEREBROVASCULAR ACCIDENT (HCC): Primary | ICD-10-CM

## 2022-07-13 DIAGNOSIS — F03.90 SENILE DEMENTIA, UNCOMPLICATED (HCC): ICD-10-CM

## 2022-07-13 PROBLEM — I63.9 CEREBROVASCULAR ACCIDENT (CVA) (HCC): Status: RESOLVED | Noted: 2018-08-23 | Resolved: 2022-07-13

## 2022-07-13 PROCEDURE — 99309 SBSQ NF CARE MODERATE MDM 30: CPT | Performed by: NURSE PRACTITIONER

## 2022-07-13 NOTE — PROGRESS NOTES
[address]  Facility: [facility]    NAME: Otoniel Templeton  AGE: 80 y o  SEX: female    DATE OF ENCOUNTER: 7/13/2022    Code status:  Full Code    Assessment and Plan     1  Hemiparesis affecting left side as late effect of cerebrovascular accident ([facility] [address] )    2  Senile dementia, uncomplicated ([facility] [address] )    3  Iron deficiency anemia secondary to inadequate dietary iron intake    4  Mixed hyperlipidemia    5  Major depressive disorder with single episode, in full remission ([facility] [address] )        All medications and routine orders were reviewed and updated as needed  Plan discussed with: Patient, nursing staff    Chief Complaint     Follow-up of chronic conditions    History of Present Illness     Billie Benitez is assessed for routine follow up  She is feeling well, in no acute distress  She follows commands but ROS difficult due to cognitive impairment  She enjoys sitting in the hallway and people watching  Mood stable  +appetite, stable bowels  Urinating sufficient amounts per nursing  No recent falls  Afebrile, VSS  Weight is stable  The following portions of the patient's history were reviewed and updated as appropriate: current medications, past family history, past medical history, past social history, past surgical history and problem list     Allergies:  No Known Allergies    Review of Systems     Review of Systems   Unable to perform ROS: Dementia   Constitutional: Negative for activity change, appetite change and unexpected weight change  HENT: Positive for dental problem, hearing loss and trouble swallowing  Respiratory: Negative for shortness of breath  Cardiovascular: Positive for leg swelling  Negative for palpitations  Gastrointestinal: Negative for constipation and diarrhea  Genitourinary: Positive for difficulty urinating  Musculoskeletal: Positive for gait problem  Neurological: Positive for speech difficulty and weakness  Psychiatric/Behavioral: Positive for confusion  Negative for sleep disturbance  Medications and orders     All medications reviewed and updated in Nursing Home EMR  Objective     Vitals: per nursing home records    Physical Exam  Vitals and nursing note reviewed  Constitutional:       General: She is awake  Appearance: Normal appearance  HENT:      Head: Normocephalic and atraumatic  Right Ear: Tympanic membrane, ear canal and external ear normal       Left Ear: Tympanic membrane, ear canal and external ear normal       Nose: Nose normal       Mouth/Throat:      Mouth: Mucous membranes are moist       Pharynx: Oropharynx is clear  Eyes:      Extraocular Movements: Extraocular movements intact  Conjunctiva/sclera: Conjunctivae normal       Pupils: Pupils are equal, round, and reactive to light  Cardiovascular:      Rate and Rhythm: Normal rate and regular rhythm  Pulses: Normal pulses  Heart sounds: Normal heart sounds  Pulmonary:      Effort: Pulmonary effort is normal       Breath sounds: Normal breath sounds  Abdominal:      General: Bowel sounds are normal       Palpations: Abdomen is soft  Musculoskeletal:         General: Normal range of motion  Cervical back: Normal range of motion and neck supple  Right lower leg: Edema present  Left lower leg: Edema present  Comments: Trace BLE edema  scoliosis   Skin:     General: Skin is warm and dry  Coloration: Skin is pale  Neurological:      General: No focal deficit present  Mental Status: She is alert  Mental status is at baseline  She is confused  GCS: GCS eye subscore is 4  GCS verbal subscore is 4  GCS motor subscore is 6  Sensory: Sensory deficit present  Motor: Weakness present  Gait: Gait abnormal       Comments: Left sided weakness   Psychiatric:         Mood and Affect: Mood normal          Speech: Speech is delayed           Behavior: Behavior normal  Behavior is cooperative  Cognition and Memory: Cognition is impaired  Memory is impaired  Comments: Alert with confusion  Able to follow commands, difficulty making needs known  Pertinent Laboratory/Diagnostic Studies: The following labs and studies were reviewed please see chart or hospital paperwork for details  We will continue the current medical regimen       MOUSTAPHA Esparza  7/13/2022 5:25 PM

## 2022-08-01 ENCOUNTER — NURSING HOME VISIT (OUTPATIENT)
Dept: FAMILY MEDICINE CLINIC | Facility: CLINIC | Age: 87
End: 2022-08-01
Payer: MEDICARE

## 2022-08-01 DIAGNOSIS — I69.354 HEMIPARESIS AFFECTING LEFT SIDE AS LATE EFFECT OF CEREBROVASCULAR ACCIDENT (HCC): Primary | ICD-10-CM

## 2022-08-01 DIAGNOSIS — F32.5 MAJOR DEPRESSIVE DISORDER WITH SINGLE EPISODE, IN FULL REMISSION (HCC): ICD-10-CM

## 2022-08-01 DIAGNOSIS — F03.90 SENILE DEMENTIA, UNCOMPLICATED (HCC): ICD-10-CM

## 2022-08-01 DIAGNOSIS — D50.8 IRON DEFICIENCY ANEMIA SECONDARY TO INADEQUATE DIETARY IRON INTAKE: ICD-10-CM

## 2022-08-01 DIAGNOSIS — E78.2 MIXED HYPERLIPIDEMIA: ICD-10-CM

## 2022-08-01 PROCEDURE — 99309 SBSQ NF CARE MODERATE MDM 30: CPT | Performed by: NURSE PRACTITIONER

## 2022-08-01 NOTE — PROGRESS NOTES
[address]  Facility: [facility]    NAME: Riaz Phillips  AGE: 80 y o  SEX: female    DATE OF ENCOUNTER: 8/1/2022    Code status:  Full Code    Assessment and Plan     1  Hemiparesis affecting left side as late effect of cerebrovascular accident ([facility] [address] )    2  Senile dementia, uncomplicated ([facility] )    3  Mixed hyperlipidemia    4  Major depressive disorder with single episode, in full remission ([facility] )    5  Iron deficiency anemia secondary to inadequate dietary iron intake        All medications and routine orders were reviewed and updated as needed  Plan discussed with: Patient, nursing staff    Chief Complaint     Follow-up of chronic conditions    History of Present Illness     Mukund Nose is assessed for follow up  She had family come to visit which nursing reports made her very happy  Today she is feeling well, in no acute distress  She is pleasant and interactive today  She denies pain/dyspnea/palpitations  +appetite, stable bowel pattern  Urinating without difficulty  She is content sitting in the hallway interacting with staff/orther residents  Afebrile, VSS  The following portions of the patient's history were reviewed and updated as appropriate: current medications, past family history, past medical history, past social history, past surgical history and problem list     Allergies:  No Known Allergies    Review of Systems     Review of Systems   Unable to perform ROS: Dementia   Constitutional: Negative for activity change, appetite change and unexpected weight change  HENT: Positive for dental problem, hearing loss and trouble swallowing  Cardiovascular: Positive for leg swelling  Gastrointestinal: Negative for constipation  Genitourinary: Negative for difficulty urinating  Musculoskeletal: Positive for gait problem  Neurological: Positive for speech difficulty and weakness     Psychiatric/Behavioral: Positive for confusion  Negative for sleep disturbance  Medications and orders     All medications reviewed and updated in Nursing Home EMR  Objective     Vitals: per nursing home records    Physical Exam  Vitals and nursing note reviewed  Constitutional:       Appearance: Normal appearance  HENT:      Head: Normocephalic and atraumatic  Right Ear: Tympanic membrane, ear canal and external ear normal       Left Ear: Tympanic membrane, ear canal and external ear normal       Nose: Nose normal       Mouth/Throat:      Mouth: Mucous membranes are moist       Pharynx: Oropharynx is clear  Eyes:      Extraocular Movements: Extraocular movements intact  Conjunctiva/sclera: Conjunctivae normal       Pupils: Pupils are equal, round, and reactive to light  Cardiovascular:      Rate and Rhythm: Normal rate and regular rhythm  Pulses: Normal pulses  Heart sounds: Normal heart sounds  Comments: S1S2 +ectopy  Pulmonary:      Effort: Pulmonary effort is normal       Breath sounds: Rales present  Comments: RLL fine rales  Abdominal:      General: Bowel sounds are normal  There is no distension  Palpations: Abdomen is soft  Tenderness: There is no abdominal tenderness  Musculoskeletal:         General: Deformity present  Normal range of motion  Cervical back: Normal range of motion and neck supple  Right lower leg: Edema present  Left lower leg: Edema present  Comments: Trace BLE edema  +scoliosis   Skin:     General: Skin is warm and dry  Coloration: Skin is pale  Comments: PVD   Neurological:      General: No focal deficit present  Mental Status: She is alert  Mental status is at baseline  She is confused  GCS: GCS eye subscore is 4  GCS verbal subscore is 4  GCS motor subscore is 6  Sensory: Sensory deficit present  Motor: Weakness present        Gait: Gait abnormal       Comments: Left sided weakness   Psychiatric:         Mood and Affect: Mood normal          Speech: Speech is delayed  Behavior: Behavior is slowed  Thought Content: Thought content normal          Cognition and Memory: Cognition is impaired  Memory is impaired  Judgment: Judgment normal          Pertinent Laboratory/Diagnostic Studies: The following labs and studies were reviewed please see chart or hospital paperwork for details  We will continue the current medical regimen       Portillo Nava  8/1/2022 3:45 PM

## 2022-09-23 ENCOUNTER — NURSING HOME VISIT (OUTPATIENT)
Dept: FAMILY MEDICINE CLINIC | Facility: CLINIC | Age: 87
End: 2022-09-23
Payer: MEDICARE

## 2022-09-23 DIAGNOSIS — E78.2 MIXED HYPERLIPIDEMIA: ICD-10-CM

## 2022-09-23 DIAGNOSIS — I69.354 HEMIPARESIS AFFECTING LEFT SIDE AS LATE EFFECT OF CEREBROVASCULAR ACCIDENT (HCC): Primary | ICD-10-CM

## 2022-09-23 DIAGNOSIS — F03.90 SENILE DEMENTIA, UNCOMPLICATED (HCC): ICD-10-CM

## 2022-09-23 DIAGNOSIS — R13.12 OROPHARYNGEAL DYSPHAGIA: ICD-10-CM

## 2022-09-23 PROBLEM — D50.8 IRON DEFICIENCY ANEMIA SECONDARY TO INADEQUATE DIETARY IRON INTAKE: Status: RESOLVED | Noted: 2022-07-13 | Resolved: 2022-09-23

## 2022-09-23 PROCEDURE — 99309 SBSQ NF CARE MODERATE MDM 30: CPT | Performed by: NURSE PRACTITIONER

## 2022-09-23 NOTE — PROGRESS NOTES
[address]  Facility: [facility]    NAME: Otoniel Templeton  AGE: 80 y o  SEX: female    DATE OF ENCOUNTER: 9/23/2022    Code status:  Full Code    Assessment and Plan     1  Hemiparesis affecting left side as late effect of cerebrovascular accident ([facility] )    2  Senile dementia, uncomplicated ([facility] )    3  Mixed hyperlipidemia    4  Oropharyngeal dysphagia        All medications and routine orders were reviewed and updated as needed  Plan discussed with: Patient, nursing staff    Chief Complaint     Follow-up of chronic conditions    History of Present Illness     Billie Benitez is assessed for routine follow up  She has an isolated choking episode on 9/15/22 while eating without complication  Speech assessed and made adjustments to her dietary recommendations  Today she is feeling well, interactive with assessment  She denies pain, dyspnea, chest pressure  Her appetite remains fair, weight is stable  Her bowels are moving and she urinates sufficient amounts per nursing  She enjoys sitting outside her room and people watching  The staff love to chat with her and engage her in conversation  No recent falls  Afebrile, VSS  Supportive family  The following portions of the patient's history were reviewed and updated as appropriate: current medications, past family history, past medical history, past social history, past surgical history and problem list     Allergies:  No Known Allergies    Review of Systems     Review of Systems   Unable to perform ROS: Dementia   Constitutional: Negative  Negative for activity change, appetite change, chills, fatigue, fever and unexpected weight change  HENT: Positive for dental problem, hearing loss and trouble swallowing  Negative for congestion, ear pain, postnasal drip and sinus pain  Eyes: Negative  Respiratory: Negative  Negative for cough, chest tightness and shortness of breath  Cardiovascular: Negative  Negative for chest pain, palpitations and leg swelling  Gastrointestinal: Negative  Negative for constipation and diarrhea  Endocrine: Negative  Genitourinary: Negative  Negative for difficulty urinating and dysuria  Musculoskeletal: Positive for gait problem  Skin: Negative  Allergic/Immunologic: Negative  Negative for immunocompromised state  Neurological: Positive for speech difficulty and weakness  Negative for dizziness and light-headedness  Hematological: Negative  Psychiatric/Behavioral: Positive for confusion  Negative for sleep disturbance  Medications and orders     All medications reviewed and updated in Nursing Home EMR  Objective     Vitals: per nursing home records    Physical Exam  Vitals and nursing note reviewed  Constitutional:       General: She is awake  Appearance: Normal appearance  Comments: frail   HENT:      Head: Normocephalic and atraumatic  Right Ear: Tympanic membrane, ear canal and external ear normal       Left Ear: Tympanic membrane, ear canal and external ear normal       Nose: Nose normal       Mouth/Throat:      Mouth: Mucous membranes are moist       Pharynx: Oropharynx is clear  Eyes:      Extraocular Movements: Extraocular movements intact  Conjunctiva/sclera: Conjunctivae normal       Pupils: Pupils are equal, round, and reactive to light  Cardiovascular:      Rate and Rhythm: Normal rate and regular rhythm  Pulses: Normal pulses  Heart sounds: Normal heart sounds  Comments: S1S2 +ectopy  Pulmonary:      Effort: Pulmonary effort is normal       Breath sounds: Normal breath sounds  Abdominal:      General: Bowel sounds are normal       Palpations: Abdomen is soft  Musculoskeletal:         General: Deformity present  Normal range of motion  Cervical back: Normal range of motion and neck supple  Right lower leg: No edema  Left lower leg: No edema  Comments: kyphosis   Skin:     General: Skin is warm and dry  Neurological:      General: No focal deficit present  Mental Status: She is alert  Mental status is at baseline  She is confused  GCS: GCS eye subscore is 4  GCS verbal subscore is 4  GCS motor subscore is 6  Sensory: Sensory deficit present  Motor: Weakness present  Gait: Gait abnormal       Comments: Left sided weakness   Psychiatric:         Mood and Affect: Mood normal          Speech: Speech is delayed  Behavior: Behavior is slowed  Behavior is cooperative  Thought Content: Thought content normal          Cognition and Memory: Cognition is impaired  Memory is impaired  Judgment: Judgment normal          Pertinent Laboratory/Diagnostic Studies: The following labs and studies were reviewed please see chart or hospital paperwork for details  Continue current medical management        Naomie Thayer, [address]   9/23/2022 12:08 PM

## 2022-10-14 ENCOUNTER — NURSING HOME VISIT (OUTPATIENT)
Dept: FAMILY MEDICINE CLINIC | Facility: CLINIC | Age: 87
End: 2022-10-14
Payer: MEDICARE

## 2022-10-14 DIAGNOSIS — R13.12 OROPHARYNGEAL DYSPHAGIA: ICD-10-CM

## 2022-10-14 DIAGNOSIS — F32.5 MAJOR DEPRESSIVE DISORDER WITH SINGLE EPISODE, IN FULL REMISSION (HCC): ICD-10-CM

## 2022-10-14 DIAGNOSIS — I69.354 HEMIPARESIS AFFECTING LEFT SIDE AS LATE EFFECT OF CEREBROVASCULAR ACCIDENT (HCC): Primary | ICD-10-CM

## 2022-10-14 DIAGNOSIS — E78.2 MIXED HYPERLIPIDEMIA: ICD-10-CM

## 2022-10-14 DIAGNOSIS — F03.90 SENILE DEMENTIA, UNCOMPLICATED (HCC): ICD-10-CM

## 2022-10-14 PROCEDURE — 99309 SBSQ NF CARE MODERATE MDM 30: CPT | Performed by: NURSE PRACTITIONER

## 2022-10-14 NOTE — PROGRESS NOTES
[address]  Fac[facility]: [facility]    NAME: Sakshi Deshpande  AGE: 80 y o  SEX: female    DATE OF ENCOUNTER: 10/14/2022    Code status:  Full Code    Assessment and Plan     1  Hemiparesis affecting left side as late effect of cerebrovascular accident ([facility] [address] )    2  Senile dementia, uncomplicated ([facility] [address] [address] )    3  Mixed hyperlipidemia    4  Major depressive disorder with single episode, in full remission ([facility] [address] )    5  Oropharyngeal dysphagia        All medications and routine orders were reviewed and updated as needed  Plan discussed with: Patient, nursing staff    Chief Complaint     Follow-up of chronic conditions    History of Present Illness     Magdalena Gonzalez is assessed for follow up  She is doing well on her adjusted diet  She denies pain, dyspnea, chest pressure  She has difficulty making her needs known but can follow commands  Appetite and bowel pattern stable  Urinating without difficulty  Healthy sleep hygiene  Afebrile, VSS  No recent falls  TThe following portions of the patient's history were reviewed and updated as appropriate: current medications, past family history, past medical history, past social history, past surgical history and problem list     Allergies:  No Known Allergies    Review of Systems     Review of Systems   Unable to perform ROS: Dementia   Constitutional: Negative  Negative for activity change, appetite change, chills, fatigue, fever and unexpected weight change  HENT: Positive for dental problem, hearing loss and trouble swallowing  Negative for congestion, ear pain, postnasal drip and sinus pain  Eyes: Negative  Respiratory: Negative  Negative for cough, chest tightness and shortness of breath  Cardiovascular: Positive for leg swelling  Negative for chest pain and palpitations  Gastrointestinal: Negative  Negative for constipation and diarrhea  Endocrine: Negative  Genitourinary: Negative  Negative for difficulty urinating and dysuria  Musculoskeletal: Positive for gait problem  Skin: Negative  Allergic/Immunologic: Negative  Negative for immunocompromised state  Neurological: Positive for speech difficulty and weakness  Negative for dizziness and light-headedness  Hematological: Negative  Psychiatric/Behavioral: Positive for confusion  Negative for sleep disturbance  Medications and orders     All medications reviewed and updated in Nursing Home EMR  Objective     Vitals: per nursing home records    Physical Exam  Vitals and nursing note reviewed  Constitutional:       General: She is awake  Appearance: Normal appearance  Comments: frail   HENT:      Head: Normocephalic and atraumatic  Right Ear: Tympanic membrane, ear canal and external ear normal       Left Ear: Tympanic membrane, ear canal and external ear normal       Nose: Nose normal       Mouth/Throat:      Mouth: Mucous membranes are moist       Pharynx: Oropharynx is clear  Eyes:      Extraocular Movements: Extraocular movements intact  Conjunctiva/sclera: Conjunctivae normal       Pupils: Pupils are equal, round, and reactive to light  Cardiovascular:      Rate and Rhythm: Normal rate  Rhythm irregular  Pulses: Normal pulses  Heart sounds: Normal heart sounds  Pulmonary:      Effort: Pulmonary effort is normal       Breath sounds: Rales present  Comments: Fine rales bases  Abdominal:      General: Bowel sounds are normal       Palpations: Abdomen is soft  Musculoskeletal:         General: Deformity present  Normal range of motion  Cervical back: Normal range of motion and neck supple  Right lower leg: Edema present  Left lower leg: Edema present  Comments: Trace BLE edema  +kyphosis   Skin:     General: Skin is warm and dry  Coloration: Skin is pale  Comments: PVD   Neurological:      General: No focal deficit present        Mental Status: She is alert  Mental status is at baseline  She is confused  GCS: GCS eye subscore is 4  GCS verbal subscore is 4  GCS motor subscore is 6  Sensory: Sensory deficit present  Motor: Weakness present  Gait: Gait abnormal       Comments: Left sided weakness   Psychiatric:         Mood and Affect: Mood normal          Speech: Speech normal          Behavior: Behavior normal  Behavior is cooperative  Thought Content: Thought content normal          Cognition and Memory: Cognition is impaired  Memory is impaired  Judgment: Judgment normal          Pertinent Laboratory/Diagnostic Studies: The following labs and studies were reviewed please see chart or hospital paperwork for details      Continue current medical management    MOUSTAPHA Miles  10/14/2022 3:45 PM

## 2022-11-11 ENCOUNTER — NURSING HOME VISIT (OUTPATIENT)
Dept: FAMILY MEDICINE CLINIC | Facility: CLINIC | Age: 87
End: 2022-11-11

## 2022-11-11 DIAGNOSIS — F03.90 SENILE DEMENTIA, UNCOMPLICATED (HCC): ICD-10-CM

## 2022-11-11 DIAGNOSIS — F32.5 MAJOR DEPRESSIVE DISORDER WITH SINGLE EPISODE, IN FULL REMISSION (HCC): ICD-10-CM

## 2022-11-11 DIAGNOSIS — E78.2 MIXED HYPERLIPIDEMIA: ICD-10-CM

## 2022-11-11 DIAGNOSIS — I69.354 HEMIPARESIS AFFECTING LEFT SIDE AS LATE EFFECT OF CEREBROVASCULAR ACCIDENT (HCC): Primary | ICD-10-CM
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
## 2022-11-11 NOTE — PROGRESS NOTES
[address]  Fac[facility]: [facility]    NAME: Melissa Acuña  AGE: 80 y o  SEX: female    DATE OF ENCOUNTER: 11/11/2022    Code status:  Full Code    Assessment and Plan     1  Hemiparesis affecting left side as late effect of cerebrovascular accident ([facility] [address] )    2  Senile dementia, uncomplicated ([facility] [address] )    3  Mixed hyperlipidemia    4  Major depressive disorder with single episode, in full remission ([email or web address] )        All medications and routine orders were reviewed and updated as needed  Plan discussed with: Patient, nursing staff    Chief Complaint     Follow-up of chronic conditions    History of Present Illness     Edwin Adler is assessed for routine follow up  She is out in the hallway socializing with staff and other residents  She denies pain, dyspnea, chest pressure  She is tolerating her diet without concerns  Her bowels are moving and she has sufficient UO per nursing  She sleeps well at night  No falls  Supportive family  Afebrile, VSS  Weight is stable  The following portions of the patient's history were reviewed and updated as appropriate: current medications, past family history, past medical history, past social history, past surgical history and problem list     Allergies:  No Known Allergies    Review of Systems     Review of Systems   Constitutional: Negative  Negative for activity change, appetite change, chills, fatigue and fever  HENT: Positive for dental problem and hearing loss  Negative for congestion, ear pain, postnasal drip, sinus pain and trouble swallowing  Eyes: Negative  Respiratory: Negative  Negative for cough and shortness of breath  Cardiovascular: Negative  Negative for chest pain and leg swelling  Gastrointestinal: Negative  Negative for constipation and diarrhea  Endocrine: Negative  Genitourinary: Negative  Negative for difficulty urinating and dysuria     Musculoskeletal: Positive for gait problem  Skin: Negative  Allergic/Immunologic: Negative  Negative for immunocompromised state  Neurological: Positive for speech difficulty and weakness  Negative for dizziness and light-headedness  Hematological: Negative  Psychiatric/Behavioral: Positive for confusion  Negative for sleep disturbance  Medications and orders     All medications reviewed and updated in Nursing Home EMR  Objective     Vitals: per nursing home records    Physical Exam  Vitals and nursing note reviewed  Constitutional:       General: She is awake  Appearance: Normal appearance  Comments: frail   HENT:      Head: Normocephalic and atraumatic  Right Ear: Tympanic membrane, ear canal and external ear normal       Left Ear: Tympanic membrane, ear canal and external ear normal       Nose: Nose normal       Mouth/Throat:      Mouth: Mucous membranes are moist       Pharynx: Oropharynx is clear  Eyes:      Extraocular Movements: Extraocular movements intact  Conjunctiva/sclera: Conjunctivae normal       Pupils: Pupils are equal, round, and reactive to light  Cardiovascular:      Rate and Rhythm: Normal rate and regular rhythm  Pulses: Normal pulses  Heart sounds: Normal heart sounds  Pulmonary:      Effort: Pulmonary effort is normal       Breath sounds: Normal breath sounds  Abdominal:      General: Bowel sounds are normal       Palpations: Abdomen is soft  Musculoskeletal:         General: Deformity present  Normal range of motion  Cervical back: Normal range of motion and neck supple  Right lower leg: Edema present  Left lower leg: Edema present  Comments: Trace BLE edema  kyphosis   Skin:     General: Skin is warm and dry  Coloration: Skin is pale  Comments: PVD   Neurological:      General: No focal deficit present  Mental Status: She is alert  Mental status is at baseline  She is confused  GCS: GCS eye subscore is 4   GCS verbal subscore is 4  GCS motor subscore is 6  Cranial Nerves: Dysarthria present  Sensory: Sensory deficit present  Motor: Weakness present  Gait: Gait abnormal    Psychiatric:         Mood and Affect: Mood normal          Speech: Speech is delayed  Behavior: Behavior is slowed  Behavior is cooperative  Thought Content: Thought content normal          Cognition and Memory: Cognition is impaired  Memory is impaired  Judgment: Judgment normal       Comments: Expressive aphasia         Pertinent Laboratory/Diagnostic Studies: The following labs and studies were reviewed please see chart or hospital paperwork for details  Continue the current medical management      MOUSTAPHA Fonseca  11/11/2022 11:46 AM

## 2022-12-02 ENCOUNTER — NURSING HOME VISIT (OUTPATIENT)
Dept: FAMILY MEDICINE CLINIC | Facility: CLINIC | Age: 87
End: 2022-12-02

## 2022-12-02 DIAGNOSIS — F03.90 SENILE DEMENTIA, UNCOMPLICATED (HCC): ICD-10-CM

## 2022-12-02 DIAGNOSIS — I69.354 HEMIPARESIS AFFECTING LEFT SIDE AS LATE EFFECT OF CEREBROVASCULAR ACCIDENT (HCC): Primary | ICD-10-CM

## 2022-12-02 DIAGNOSIS — E78.2 MIXED HYPERLIPIDEMIA: ICD-10-CM

## 2022-12-02 DIAGNOSIS — F32.5 MAJOR DEPRESSIVE DISORDER WITH SINGLE EPISODE, IN FULL REMISSION (HCC): ICD-10-CM

## 2022-12-02 DIAGNOSIS — M41.25 OTHER IDIOPATHIC SCOLIOSIS, THORACOLUMBAR REGION: ICD-10-CM

## 2022-12-02 DIAGNOSIS — R13.12 OROPHARYNGEAL DYSPHAGIA: ICD-10-CM

## 2022-12-02 NOTE — PROGRESS NOTES
[address]  [place], [address]  Facility: [facility]    NAME: Tabby Wise  AGE: 80 y o  SEX: female    DATE OF ENCOUNTER: 12/2/2022    Code status:  Full Code    Assessment and Plan     1  Hemiparesis affecting left side as late effect of cerebrovascular accident ([facility] [address] [address] )    2  Senile dementia, uncomplicated ([facility] [address] )    3  Major depressive disorder with single episode, in full remission ([facility] [address] )    4  Oropharyngeal dysphagia    5  Mixed hyperlipidemia    6  Other idiopathic scoliosis, thoracolumbar region        All medications and routine orders were reviewed and updated as needed  Plan discussed with: Patient, nursing staff    Chief Complaint     Follow-up of chronic conditions    History of Present Illness     Joanie Hernandez is assessed for follow up  She is feeling well, no acute distress  ROS difficult due to cognitive deficit, however she can follow commands and some difficulty making needs known  Appetite stable, bowels are moving  No s/s dysuria per nursing  Healthy sleep hygiene  Mood stable  Supportive family  Afebrile, VSS  Weight WNL  No falls  The following portions of the patient's history were reviewed and updated as appropriate: current medications, past family history, past medical history, past social history, past surgical history and problem list     Allergies:  No Known Allergies    Review of Systems     Review of Systems   Unable to perform ROS: Dementia   Constitutional: Negative  Negative for activity change, appetite change, chills, fatigue and fever  HENT: Positive for dental problem, hearing loss and trouble swallowing  Negative for congestion, ear pain, postnasal drip and sinus pain  Eyes: Negative  Respiratory: Negative  Negative for cough, chest tightness and shortness of breath  Cardiovascular: Negative  Negative for chest pain and leg swelling  Gastrointestinal: Negative  Negative for constipation and diarrhea  Endocrine: Negative  Genitourinary: Negative  Negative for difficulty urinating and dysuria  Musculoskeletal: Positive for gait problem  Skin: Negative  Allergic/Immunologic: Negative  Negative for immunocompromised state  Neurological: Positive for speech difficulty and weakness  Negative for dizziness and light-headedness  Hematological: Negative  Psychiatric/Behavioral: Positive for confusion  Negative for sleep disturbance  Medications and orders     All medications reviewed and updated in Nursing Home EMR  Objective     Vitals: per nursing home records    Physical Exam  Vitals and nursing note reviewed  Constitutional:       General: She is awake  Appearance: Normal appearance  Comments: frail   HENT:      Head: Normocephalic and atraumatic  Right Ear: Tympanic membrane, ear canal and external ear normal       Left Ear: Tympanic membrane, ear canal and external ear normal       Nose: Nose normal       Mouth/Throat:      Mouth: Mucous membranes are moist       Pharynx: Oropharynx is clear  Eyes:      Extraocular Movements: Extraocular movements intact  Conjunctiva/sclera: Conjunctivae normal       Pupils: Pupils are equal, round, and reactive to light  Cardiovascular:      Rate and Rhythm: Normal rate  Rhythm irregular  Pulses: Normal pulses  Heart sounds: Normal heart sounds  Pulmonary:      Effort: Pulmonary effort is normal       Breath sounds: Normal breath sounds  Abdominal:      General: Bowel sounds are normal       Palpations: Abdomen is soft  Musculoskeletal:         General: Deformity present  Normal range of motion  Cervical back: Normal range of motion and neck supple  Right lower leg: Edema present  Left lower leg: Edema present  Comments: Scoliosis/kyphosis  Trace BLE edema   Skin:     General: Skin is warm and dry  Coloration: Skin is pale        Comments: PVD   Neurological:      General: No focal deficit present  Mental Status: She is alert  Mental status is at baseline  She is confused  GCS: GCS eye subscore is 4  GCS verbal subscore is 4  GCS motor subscore is 6  Cranial Nerves: Dysarthria present  Sensory: Sensory deficit present  Motor: Weakness present  Gait: Gait abnormal       Comments: Left hemiparesis   Psychiatric:         Mood and Affect: Mood normal          Speech: Speech is delayed  Behavior: Behavior is slowed  Behavior is cooperative  Thought Content: Thought content normal          Cognition and Memory: Cognition is impaired  Memory is impaired  Judgment: Judgment normal          Pertinent Laboratory/Diagnostic Studies: The following labs and studies were reviewed please see chart or hospital paperwork for details  Continue current medical management      Portillo Jasso  12/2/2022 11:48 AM

## 2023-01-06 ENCOUNTER — NURSING HOME VISIT (OUTPATIENT)
Dept: FAMILY MEDICINE CLINIC | Facility: CLINIC | Age: 88
End: 2023-01-06

## 2023-01-06 DIAGNOSIS — F32.5 MAJOR DEPRESSIVE DISORDER WITH SINGLE EPISODE, IN FULL REMISSION (HCC): ICD-10-CM

## 2023-01-06 DIAGNOSIS — I69.354 HEMIPARESIS AFFECTING LEFT SIDE AS LATE EFFECT OF CEREBROVASCULAR ACCIDENT (HCC): Primary | ICD-10-CM

## 2023-01-06 DIAGNOSIS — R13.12 OROPHARYNGEAL DYSPHAGIA: ICD-10-CM

## 2023-01-06 DIAGNOSIS — E78.2 MIXED HYPERLIPIDEMIA: ICD-10-CM

## 2023-01-06 DIAGNOSIS — F03.90 SENILE DEMENTIA, UNCOMPLICATED (HCC): ICD-10-CM

## 2023-01-06 DIAGNOSIS — M41.25 OTHER IDIOPATHIC SCOLIOSIS, THORACOLUMBAR REGION: ICD-10-CM

## 2023-01-06 NOTE — PROGRESS NOTES
[address]  [facility]: [facility]    NAME: Benigno Henderson  AGE: 80 y o  SEX: female    DATE OF ENCOUNTER: 1/6/2023    Code status:  Full Code    Assessment and Plan     1  Hemiparesis affecting left side as late effect of cerebrovascular accident ([facility] [address] )    2  Senile dementia, uncomplicated ([facility] [address] )    3  Major depressive disorder with single episode, in full remission ([facility] [address] )    4  Mixed hyperlipidemia    5  Oropharyngeal dysphagia    6  Other idiopathic scoliosis, thoracolumbar region        All medications and routine orders were reviewed and updated as needed  Plan discussed with: Patient, nursing staff    Chief Complaint     Follow-up of chronic conditions    History of Present Illness     Riley Jacobs is assessed for follow up  She denies pain, appears comfortable sitting in the common area in no acute distress  She has a stable appetite, stable weight without s/s fluid overload  She is moving her bowels and urinating sufficient amounts without complication  Healthy sleep hygiene  Enjoys community life activities  Afebrile, VSS  No falls  The following portions of the patient's history were reviewed and updated as appropriate: current medications, past family history, past medical history, past social history, past surgical history and problem list     Allergies:  No Known Allergies    Review of Systems     Review of Systems   Constitutional: Negative  Negative for activity change, appetite change, chills, fatigue and fever  HENT: Positive for dental problem, hearing loss and trouble swallowing  Negative for congestion, ear pain, postnasal drip and sinus pain  Eyes: Negative  Respiratory: Negative  Negative for cough, chest tightness and shortness of breath  Cardiovascular: Negative  Negative for chest pain and leg swelling  Gastrointestinal: Negative  Negative for constipation and diarrhea  Endocrine: Negative  Genitourinary: Negative  Negative for dysuria  Musculoskeletal: Positive for gait problem  Skin: Negative  Allergic/Immunologic: Negative  Negative for immunocompromised state  Neurological: Positive for speech difficulty and weakness  Negative for dizziness and light-headedness  Hematological: Negative  Psychiatric/Behavioral: Positive for confusion  Negative for sleep disturbance  Medications and orders     All medications reviewed and updated in Nursing Home EMR  Objective     Vitals: per nursing home records    Physical Exam  Vitals and nursing note reviewed  Constitutional:       General: She is awake  Comments: frail   HENT:      Head: Normocephalic and atraumatic  Right Ear: Tympanic membrane, ear canal and external ear normal       Left Ear: Tympanic membrane, ear canal and external ear normal       Nose: Nose normal       Mouth/Throat:      Mouth: Mucous membranes are moist       Pharynx: Oropharynx is clear  Eyes:      Extraocular Movements: Extraocular movements intact  Conjunctiva/sclera: Conjunctivae normal       Pupils: Pupils are equal, round, and reactive to light  Cardiovascular:      Rate and Rhythm: Normal rate  Rhythm irregular  Pulses: Normal pulses  Heart sounds: Normal heart sounds  Pulmonary:      Effort: Pulmonary effort is normal       Breath sounds: Normal breath sounds  Abdominal:      General: Bowel sounds are normal       Palpations: Abdomen is soft  Musculoskeletal:         General: Normal range of motion  Cervical back: Normal range of motion and neck supple  Right lower leg: No edema  Left lower leg: No edema  Comments: Tubi     Skin:     General: Skin is warm and dry  Comments: PVD   Neurological:      General: No focal deficit present  Mental Status: She is alert  Mental status is at baseline  She is confused  GCS: GCS eye subscore is 4  GCS verbal subscore is 4   GCS motor subscore is 6  Sensory: Sensory deficit present  Motor: Weakness present  Gait: Gait abnormal       Comments: Left hemiparesis   Psychiatric:         Mood and Affect: Mood normal          Speech: Speech is delayed  Behavior: Behavior is slowed and withdrawn  Behavior is cooperative  Thought Content: Thought content normal          Cognition and Memory: Cognition is impaired  Memory is impaired  Judgment: Judgment normal          Pertinent Laboratory/Diagnostic Studies: The following labs and studies were reviewed please see chart or hospital paperwork for details  Continue current medical management      MOUSTAPHA Power  1/6/2023 5:50 PM

## 2023-02-03 ENCOUNTER — NURSING HOME VISIT (OUTPATIENT)
Dept: FAMILY MEDICINE CLINIC | Facility: CLINIC | Age: 88
End: 2023-02-03

## 2023-02-03 DIAGNOSIS — I69.354 HEMIPARESIS AFFECTING LEFT SIDE AS LATE EFFECT OF CEREBROVASCULAR ACCIDENT (HCC): Primary | ICD-10-CM

## 2023-02-03 DIAGNOSIS — R26.9 NEUROLOGIC GAIT DYSFUNCTION: ICD-10-CM

## 2023-02-03 DIAGNOSIS — F03.90 SENILE DEMENTIA, UNCOMPLICATED (HCC): ICD-10-CM

## 2023-02-03 DIAGNOSIS — E78.2 MIXED HYPERLIPIDEMIA: ICD-10-CM

## 2023-02-03 DIAGNOSIS — F32.5 MAJOR DEPRESSIVE DISORDER WITH SINGLE EPISODE, IN FULL REMISSION (HCC): ICD-10-CM

## 2023-02-03 DIAGNOSIS — R13.12 OROPHARYNGEAL DYSPHAGIA: ICD-10-CM

## 2023-02-03 NOTE — PROGRESS NOTES
[address]  Facility: [facility]    NAME: Viviane Leyva  AGE: 80 y o  SEX: female    DATE OF ENCOUNTER: 2/3/2023    Code status:  Full Code    Assessment and Plan     1  Hemiparesis affecting left side as late effect of cerebrovascular accident ([facility] [address] )    2  Senile dementia, uncomplicated ([facility] [address] )    3  Mixed hyperlipidemia    4  Oropharyngeal dysphagia    5  Major depressive disorder with single episode, in full remission ([facility] [address] )    6  Neurologic gait dysfunction        All medications and routine orders were reviewed and updated as needed  Plan discussed with: Patient, nursing staff    Chief Complaint     Follow-up of chronic conditions    History of Present Illness     Janak Medrano is assessed for follow up  She is feeling well, no acute distress  She is following commands, will answer questions with some difficulty making needs known at times  Mood is stable, she denies pain, dyspnea, chest pressure  Appetite remains stable, dysphagia diet with supervision in dining room  Bowel pattern stable, incontinent of sufficient amounts of urine  Healthy sleep pattern  Afebrile, VSS  Enjoys sitting out in hallway people watching  The following portions of the patient's history were reviewed and updated as appropriate: current medications, past family history, past medical history, past social history, past surgical history and problem list     Allergies:  No Known Allergies    Review of Systems     Review of Systems   Constitutional: Negative  Negative for activity change, appetite change, chills, fatigue and fever  HENT: Positive for dental problem, hearing loss and trouble swallowing  Negative for congestion, ear pain, postnasal drip and sinus pain  Eyes: Negative  Respiratory: Negative  Negative for cough, chest tightness and shortness of breath  Cardiovascular: Negative  Negative for chest pain, palpitations and leg swelling  Gastrointestinal: Negative  Negative for constipation and diarrhea  Endocrine: Negative  Genitourinary: Negative  Negative for difficulty urinating and dysuria  Musculoskeletal: Positive for gait problem  Skin: Negative  Allergic/Immunologic: Negative  Negative for immunocompromised state  Neurological: Positive for speech difficulty and weakness  Negative for dizziness and light-headedness  Hematological: Negative  Psychiatric/Behavioral: Positive for confusion  Negative for sleep disturbance  Medications and orders     All medications reviewed and updated in Nursing Home EMR  Objective     Vitals: per nursing home records    Physical Exam  Vitals and nursing note reviewed  Constitutional:       General: She is awake  Appearance: Normal appearance  Comments: frail   HENT:      Head: Normocephalic and atraumatic  Right Ear: Tympanic membrane, ear canal and external ear normal       Left Ear: Tympanic membrane, ear canal and external ear normal       Nose: Nose normal       Mouth/Throat:      Mouth: Mucous membranes are moist       Pharynx: Oropharynx is clear  Eyes:      Extraocular Movements: Extraocular movements intact  Conjunctiva/sclera: Conjunctivae normal       Pupils: Pupils are equal, round, and reactive to light  Cardiovascular:      Rate and Rhythm: Normal rate and regular rhythm  Pulses: Normal pulses  Heart sounds: Normal heart sounds  Pulmonary:      Effort: Pulmonary effort is normal       Breath sounds: Normal breath sounds  Abdominal:      General: Bowel sounds are normal       Palpations: Abdomen is soft  Musculoskeletal:         General: Deformity present  Normal range of motion  Cervical back: Normal range of motion and neck supple  Comments: kyphosis   Skin:     General: Skin is warm and dry  Comments: PVD   Neurological:      General: No focal deficit present  Mental Status: She is alert   Mental status is at baseline  She is confused  GCS: GCS eye subscore is 4  GCS verbal subscore is 4  GCS motor subscore is 6  Sensory: Sensory deficit present  Motor: Weakness present  Gait: Gait abnormal       Comments: Right hemiparesis   Psychiatric:         Mood and Affect: Mood normal          Speech: Speech is delayed  Behavior: Behavior is slowed  Behavior is cooperative  Cognition and Memory: Cognition is impaired  Memory is impaired  Judgment: Judgment is inappropriate  Pertinent Laboratory/Diagnostic Studies: The following labs and studies were reviewed please see chart or hospital paperwork for details      Continue current medical management    MOUSTAPHA Gurrola  2/3/2023 8:35 AM

## 2023-03-06 ENCOUNTER — NURSING HOME VISIT (OUTPATIENT)
Dept: FAMILY MEDICINE CLINIC | Facility: CLINIC | Age: 88
End: 2023-03-06

## 2023-03-06 DIAGNOSIS — M41.25 OTHER IDIOPATHIC SCOLIOSIS, THORACOLUMBAR REGION: ICD-10-CM

## 2023-03-06 DIAGNOSIS — F03.90 SENILE DEMENTIA, UNCOMPLICATED (HCC): ICD-10-CM

## 2023-03-06 DIAGNOSIS — I69.354 HEMIPARESIS AFFECTING LEFT SIDE AS LATE EFFECT OF CEREBROVASCULAR ACCIDENT (HCC): Primary | ICD-10-CM

## 2023-03-06 DIAGNOSIS — E78.2 MIXED HYPERLIPIDEMIA: ICD-10-CM

## 2023-03-06 NOTE — PROGRESS NOTES
[address]  Facility: [facility]    NAME: Shy Hewitt  AGE: 80 y o  SEX: female    DATE OF ENCOUNTER: 3/6/2023    Code status:  Full Code    Assessment and Plan     1  Hemiparesis affecting left side as late effect of cerebrovascular accident ([facility] [address] )    2  Senile dementia, uncomplicated ([facility] [address] )    3  Other idiopathic scoliosis, thoracolumbar region    4  Mixed hyperlipidemia        All medications and routine orders were reviewed and updated as needed  Plan discussed with: Patient, nursing staff    Chief Complaint     Follow-up of chronic conditions    History of Present Illness     Sherin Ryan is assessed for follow up  She is finishing her lunch, has a great appetite  She is calm/pleasant with assessment  She denies pain, dyspnea, chest pressure  Her bowels are moving  She is incontinent of urine, sufficient amounts per nursing  She is sleeping well at night  No recent falls  Afebrile, VSS  The following portions of the patient's history were reviewed and updated as appropriate: current medications, past family history, past medical history, past social history, past surgical history and problem list     Allergies:  No Known Allergies    Review of Systems     Review of Systems   Constitutional: Negative  Negative for activity change, appetite change, chills, fatigue and fever  HENT: Positive for dental problem, hearing loss and trouble swallowing  Negative for congestion, ear pain, postnasal drip and sinus pain  Eyes: Negative  Respiratory: Negative  Negative for cough, chest tightness and shortness of breath  Cardiovascular: Negative  Negative for chest pain and leg swelling  Gastrointestinal: Negative  Negative for constipation and diarrhea  Endocrine: Negative  Genitourinary: Negative  Negative for dysuria  Musculoskeletal: Positive for gait problem  Skin: Negative  Allergic/Immunologic: Negative  Negative for immunocompromised state  Neurological: Positive for speech difficulty and weakness  Negative for dizziness and light-headedness  Hematological: Negative  Psychiatric/Behavioral: Positive for confusion  Negative for sleep disturbance  Medications and orders     All medications reviewed and updated in Nursing Home EMR  Objective     Vitals: per nursing home records    Physical Exam  Vitals and nursing note reviewed  Constitutional:       General: She is awake  Appearance: Normal appearance  Comments: frail   HENT:      Head: Normocephalic and atraumatic  Right Ear: Tympanic membrane, ear canal and external ear normal  Decreased hearing noted  Left Ear: Tympanic membrane, ear canal and external ear normal  Decreased hearing noted  Nose: Nose normal       Mouth/Throat:      Mouth: Mucous membranes are moist       Pharynx: Oropharynx is clear  Eyes:      Extraocular Movements: Extraocular movements intact  Conjunctiva/sclera: Conjunctivae normal       Pupils: Pupils are equal, round, and reactive to light  Cardiovascular:      Rate and Rhythm: Normal rate and regular rhythm  Pulses: Normal pulses  Heart sounds: Normal heart sounds  Pulmonary:      Effort: Pulmonary effort is normal       Breath sounds: Normal breath sounds  Abdominal:      General: Bowel sounds are normal       Palpations: Abdomen is soft  Musculoskeletal:         General: Deformity present  Normal range of motion  Cervical back: Normal range of motion and neck supple  Right lower leg: No edema  Left lower leg: No edema  Skin:     General: Skin is warm and dry  Coloration: Skin is pale  Neurological:      General: No focal deficit present  Mental Status: She is alert  Mental status is at baseline  She is confused  GCS: GCS eye subscore is 4  GCS verbal subscore is 4  GCS motor subscore is 6  Motor: Weakness present        Gait: Gait abnormal       Comments: Left hemiparesis   Psychiatric:         Mood and Affect: Mood normal          Speech: Speech is delayed  Behavior: Behavior is slowed  Behavior is cooperative  Thought Content: Thought content normal          Cognition and Memory: Cognition is impaired  Memory is impaired  Judgment: Judgment is inappropriate  Pertinent Laboratory/Diagnostic Studies: The following labs and studies were reviewed please see chart or hospital paperwork for details  Continue current medical management      Stark Heimlich, CRNP  3/6/2023 1:13 PM

## 2023-04-14 PROBLEM — M41.86 LEVOSCOLIOSIS OF LUMBAR SPINE: Status: ACTIVE | Noted: 2023-04-14

## 2023-04-14 PROBLEM — M41.84 DEXTROSCOLIOSIS OF THORACIC SPINE: Status: ACTIVE | Noted: 2023-04-14

## 2023-05-12 ENCOUNTER — NURSING HOME VISIT (OUTPATIENT)
Dept: FAMILY MEDICINE CLINIC | Facility: CLINIC | Age: 88
End: 2023-05-12

## 2023-05-12 DIAGNOSIS — E78.2 MIXED HYPERLIPIDEMIA: ICD-10-CM

## 2023-05-12 DIAGNOSIS — I69.354 HEMIPARESIS AFFECTING LEFT SIDE AS LATE EFFECT OF CEREBROVASCULAR ACCIDENT (HCC): Primary | ICD-10-CM

## 2023-05-12 DIAGNOSIS — M41.25 OTHER IDIOPATHIC SCOLIOSIS, THORACOLUMBAR REGION: ICD-10-CM

## 2023-05-12 DIAGNOSIS — F03.90 SENILE DEMENTIA, UNCOMPLICATED (HCC): ICD-10-CM

## 2023-05-12 DIAGNOSIS — R26.9 NEUROLOGIC GAIT DYSFUNCTION: ICD-10-CM

## 2023-05-12 NOTE — PROGRESS NOTES
[address]  Fac[facility]: [facility]    NAME: Daya Tucker  AGE: 80 y o  SEX: female    DATE OF ENCOUNTER: 5/12/2023    Code status:  Full Code    Assessment and Plan     1  Hemiparesis affecting left side as late effect of cerebrovascular accident ([facility] [address] )    2  Senile dementia, uncomplicated ([facility] [address] )    3  Other idiopathic scoliosis, thoracolumbar region    4  Mixed hyperlipidemia    5  Neurologic gait dysfunction        All medications and routine orders were reviewed and updated as needed  Plan discussed with: Patient, nursing staff    Chief Complaint     Follow-up of chronic conditions    History of Present Illness     Maru Child is assessed for follow up  She appears comfortable, no acute distress  She is able to follow commands and make most immediate needs known  Appetite is stable, staying hydrated  Bowels are moving, UI sufficient amounts per nursing  Sleeping well at night  Enjoys some community life activities  Afebrile, VSS  Weight stable  No falls  The following portions of the patient's history were reviewed and updated as appropriate: current medications, past family history, past medical history, past social history, past surgical history and problem list     Allergies:  No Known Allergies    Review of Systems     Review of Systems   Unable to perform ROS: Dementia   Constitutional: Negative  Negative for activity change, appetite change, chills, fatigue and fever  HENT: Positive for dental problem, hearing loss and trouble swallowing  Negative for congestion, ear pain, postnasal drip and sinus pain  Eyes: Negative  Respiratory: Negative  Negative for cough and shortness of breath  Cardiovascular: Negative  Negative for chest pain and leg swelling  Gastrointestinal: Negative  Negative for constipation and diarrhea  Endocrine: Negative  Genitourinary: Negative  Negative for dysuria     Musculoskeletal: Positive for gait problem  Skin: Negative  Allergic/Immunologic: Negative  Negative for immunocompromised state  Neurological: Positive for speech difficulty and weakness  Negative for dizziness and light-headedness  Hematological: Negative  Psychiatric/Behavioral: Positive for confusion  Medications and orders     All medications reviewed and updated in Nursing Home EMR  Objective     Vitals: per nursing home records    Physical Exam  Vitals and nursing note reviewed  Constitutional:       General: She is awake  Comments: frail   HENT:      Head: Normocephalic and atraumatic  Right Ear: Tympanic membrane, ear canal and external ear normal  Decreased hearing noted  Left Ear: Tympanic membrane, ear canal and external ear normal  Decreased hearing noted  Nose: Nose normal       Mouth/Throat:      Mouth: Mucous membranes are moist       Dentition: Abnormal dentition  Pharynx: Oropharynx is clear  Eyes:      Extraocular Movements: Extraocular movements intact  Conjunctiva/sclera: Conjunctivae normal       Pupils: Pupils are equal, round, and reactive to light  Cardiovascular:      Rate and Rhythm: Normal rate and regular rhythm  Pulses: Normal pulses  Heart sounds: Normal heart sounds  Pulmonary:      Effort: Pulmonary effort is normal       Breath sounds: Normal breath sounds  Abdominal:      General: Bowel sounds are normal       Palpations: Abdomen is soft  Musculoskeletal:         General: Deformity present  Normal range of motion  Cervical back: Normal range of motion and neck supple  Skin:     General: Skin is warm and dry  Neurological:      General: No focal deficit present  Mental Status: She is alert  Mental status is at baseline  She is confused  GCS: GCS eye subscore is 4  GCS verbal subscore is 4  GCS motor subscore is 6  Sensory: Sensory deficit present  Motor: Weakness present        Gait: Gait abnormal       Comments: Left hemiparesis   Psychiatric:         Mood and Affect: Mood normal          Speech: Speech is delayed  Behavior: Behavior is slowed  Behavior is cooperative  Thought Content: Thought content normal          Cognition and Memory: Cognition is impaired  Judgment: Judgment normal          Pertinent Laboratory/Diagnostic Studies: The following labs and studies were reviewed please see chart or hospital paperwork for details      No new orders    MOUSTAPHA Baird  5/12/2023 1:54 PM

## 2023-06-02 ENCOUNTER — NURSING HOME VISIT (OUTPATIENT)
Dept: FAMILY MEDICINE CLINIC | Facility: CLINIC | Age: 88
End: 2023-06-02

## 2023-06-02 DIAGNOSIS — I69.354 HEMIPARESIS AFFECTING LEFT SIDE AS LATE EFFECT OF CEREBROVASCULAR ACCIDENT (HCC): Primary | ICD-10-CM

## 2023-06-02 DIAGNOSIS — R26.9 NEUROLOGIC GAIT DYSFUNCTION: ICD-10-CM

## 2023-06-02 DIAGNOSIS — M41.25 OTHER IDIOPATHIC SCOLIOSIS, THORACOLUMBAR REGION: ICD-10-CM

## 2023-06-02 DIAGNOSIS — F03.90 SENILE DEMENTIA, UNCOMPLICATED (HCC): ICD-10-CM

## 2023-06-02 DIAGNOSIS — E78.2 MIXED HYPERLIPIDEMIA: ICD-10-CM

## 2023-06-02 PROBLEM — R58 ECCHYMOSIS: Status: RESOLVED | Noted: 2018-07-17 | Resolved: 2023-06-02

## 2023-06-02 NOTE — PROGRESS NOTES
[address]  Facility: [facility]    NAME: Gilda Patel  AGE: 80 y o  SEX: female    DATE OF ENCOUNTER: 6/2/2023    Code status:  Full Code    Assessment and Plan     1  Hemiparesis affecting left side as late effect of cerebrovascular accident ([facility] [address] )    2  Senile dementia, uncomplicated ([facility] [address] )    3  Other idiopathic scoliosis, thoracolumbar region    4  Mixed hyperlipidemia    5  Neurologic gait dysfunction        All medications and routine orders were reviewed and updated as needed  Plan discussed with: Patient, nursing staff    Chief Complaint     Follow-up of chronic conditions    History of Present Illness     Velma Song is assessed for follow up  She is joking around with the staff and other residents in the hallway, in no acute distress  Her appetite is fair, speech monitoring dysphagia and adjusting diet appropriately  Her bowels are moving, she is urinating sufficient amounts per nursing  She is sleeping well at night and enjoys some of the community life activities  Afebrile, VSS  No recent falls  The following portions of the patient's history were reviewed and updated as appropriate: current medications, past family history, past medical history, past social history, past surgical history and problem list     Allergies:  No Known Allergies    Review of Systems     Review of Systems   Unable to perform ROS: Dementia   Constitutional: Negative  Negative for activity change, appetite change, chills, fatigue and fever  HENT: Positive for dental problem, hearing loss and trouble swallowing  Negative for congestion, ear pain, postnasal drip and sinus pain  Eyes: Negative  Respiratory: Negative  Negative for cough and shortness of breath  Cardiovascular: Negative  Negative for chest pain and leg swelling  Gastrointestinal: Negative  Negative for constipation and diarrhea  Endocrine: Negative      Genitourinary: Negative  Negative for dysuria  Musculoskeletal: Positive for gait problem  Skin: Negative  Allergic/Immunologic: Negative  Negative for immunocompromised state  Neurological: Positive for speech difficulty and weakness  Negative for dizziness and light-headedness  Hematological: Negative  Psychiatric/Behavioral: Positive for confusion  Medications and orders     All medications reviewed and updated in Nursing Home EMR  Objective     Vitals: per nursing home records    Physical Exam  Vitals and nursing note reviewed  Constitutional:       General: She is awake  Appearance: Normal appearance  Comments: frail   HENT:      Head: Normocephalic and atraumatic  Right Ear: Tympanic membrane, ear canal and external ear normal  Decreased hearing noted  Left Ear: Tympanic membrane, ear canal and external ear normal  Decreased hearing noted  Nose: Nose normal       Mouth/Throat:      Mouth: Mucous membranes are moist       Dentition: Abnormal dentition  Pharynx: Oropharynx is clear  Eyes:      Extraocular Movements: Extraocular movements intact  Conjunctiva/sclera: Conjunctivae normal       Pupils: Pupils are equal, round, and reactive to light  Cardiovascular:      Rate and Rhythm: Normal rate and regular rhythm  Pulses: Normal pulses  Heart sounds: Normal heart sounds  Pulmonary:      Effort: Pulmonary effort is normal       Breath sounds: Normal breath sounds  Abdominal:      General: Bowel sounds are normal       Palpations: Abdomen is soft  Musculoskeletal:         General: Deformity present  Normal range of motion  Cervical back: Normal range of motion and neck supple  Skin:     General: Skin is warm and dry  Neurological:      General: No focal deficit present  Mental Status: She is alert  Mental status is at baseline  She is confused  GCS: GCS eye subscore is 4  GCS verbal subscore is 4  GCS motor subscore is 6  Sensory: Sensory deficit present  Motor: Weakness present  Gait: Gait abnormal       Comments: Left hemiparesis   Psychiatric:         Mood and Affect: Mood normal          Speech: Speech is delayed  Behavior: Behavior is slowed  Behavior is cooperative  Thought Content: Thought content normal          Cognition and Memory: Cognition is impaired  Memory is impaired  Judgment: Judgment normal          Pertinent Laboratory/Diagnostic Studies: The following labs and studies were reviewed please see chart or hospital paperwork for details      Continue current medical management    MOUSTAPHA Ortez  6/2/2023 5:01 PM

## 2023-07-12 ENCOUNTER — NURSING HOME VISIT (OUTPATIENT)
Dept: FAMILY MEDICINE CLINIC | Facility: CLINIC | Age: 88
End: 2023-07-12
Payer: COMMERCIAL

## 2023-07-12 DIAGNOSIS — M41.86 LEVOSCOLIOSIS OF LUMBAR SPINE: ICD-10-CM

## 2023-07-12 DIAGNOSIS — J06.9 ACUTE URI: Primary | ICD-10-CM

## 2023-07-12 DIAGNOSIS — R13.12 OROPHARYNGEAL DYSPHAGIA: ICD-10-CM

## 2023-07-12 DIAGNOSIS — I69.354 HEMIPARESIS AFFECTING LEFT SIDE AS LATE EFFECT OF CEREBROVASCULAR ACCIDENT (HCC): ICD-10-CM

## 2023-07-12 DIAGNOSIS — F03.90 SENILE DEMENTIA, UNCOMPLICATED (HCC): ICD-10-CM

## 2023-07-12 DIAGNOSIS — M41.84 DEXTROSCOLIOSIS OF THORACIC SPINE: ICD-10-CM

## 2023-07-12 PROCEDURE — 99309 SBSQ NF CARE MODERATE MDM 30: CPT | Performed by: NURSE PRACTITIONER

## 2023-07-12 NOTE — PROGRESS NOTES
[address]  2026 [facility], [address] [place] [address]  Facility: [facility]    NAME: Analy Padilla  AGE: 80 y.o. SEX: female    DATE OF ENCOUNTER: 7/12/2023    Code status:  Full Code    Assessment and Plan     1. Acute URI    2. Hemiparesis affecting left side as late effect of cerebrovascular accident ([address])    3. Senile dementia, uncomplicated ([address])    4. Oropharyngeal dysphagia    5. Dextroscoliosis of thoracic spine    6. Levoscoliosis of lumbar spine        All medications and routine orders were reviewed and updated as needed. Plan discussed with: Patient, nursing staff    Chief Complaint     Follow-up of chronic conditions    History of Present Illness     Mauricio Olivas is assessed today for nursing concern. Lethargy with diminished appetite since last evening. No witnessed episode of aspiration per nursing. Associated hypoxia with tachypnea this morning 82% RA, O2 NC applied and sat now 94% 2L NC.  +[facility]. COVID swab negative. A few residents on unit with similar symptoms however Mauricio Olivas has not been in close contact with them. T 97.2, HR 72, RR26 and shallow, Bp 160/78, 94% 2L NC. The following portions of the patient's history were reviewed and updated as appropriate: current medications, past family history, past medical history, past social history, past surgical history and problem list.    Allergies:  No Known Allergies    Review of Systems     Review of Systems   Unable to perform ROS: Dementia   Constitutional: Positive for activity change, appetite change and fatigue. Negative for chills and fever. HENT: Positive for dental problem, hearing loss and trouble swallowing. Negative for congestion, ear pain, postnasal drip and sinus pain. Eyes: Negative. Respiratory: Positive for cough and shortness of breath. Cardiovascular: Negative. Negative for chest pain and leg swelling. Gastrointestinal: Negative. Negative for constipation and diarrhea.    Endocrine: Negative. Genitourinary: Negative. Negative for dysuria. Musculoskeletal: Positive for gait problem. Skin: Negative. Allergic/Immunologic: Negative. Negative for immunocompromised state. Neurological: Positive for speech difficulty and weakness. Negative for dizziness and light-headedness. Hematological: Negative. Psychiatric/Behavioral: Positive for confusion and sleep disturbance. Medications and orders     All medications reviewed and updated in [facility] EMR. Objective     Vitals: per nursing home records    Physical Exam  Vitals and nursing note reviewed. Constitutional:       Appearance: She is ill-appearing. Interventions: Nasal cannula in place. HENT:      Head: Normocephalic and atraumatic. Right Ear: Tympanic membrane, ear canal and external ear normal. Decreased hearing noted. Left Ear: Tympanic membrane, ear canal and external ear normal. Decreased hearing noted. Nose: Nose normal.      Mouth/Throat:      Mouth: Mucous membranes are dry. Pharynx: Oropharynx is clear. Eyes:      Extraocular Movements: Extraocular movements intact. Conjunctiva/sclera: Conjunctivae normal.      Pupils: Pupils are equal, round, and reactive to light. Cardiovascular:      Rate and Rhythm: Normal rate and regular rhythm. Pulses: Normal pulses. Heart sounds: Murmur heard. Pulmonary:      Breath sounds: Decreased air movement present. Examination of the right-lower field reveals decreased breath sounds. Examination of the left-lower field reveals decreased breath sounds. Decreased breath sounds present. Abdominal:      General: Bowel sounds are normal.      Palpations: Abdomen is soft. Musculoskeletal:         General: Deformity present. Normal range of motion. Cervical back: Normal range of motion and neck supple. Skin:     General: Skin is warm and dry. Neurological:      General: No focal deficit present.       Mental Status: She is lethargic. GCS: GCS eye subscore is 3. GCS verbal subscore is 4. GCS motor subscore is 5. Sensory: Sensory deficit present. Motor: Weakness present. Gait: Gait abnormal.      Comments: Left hemiparesis   Psychiatric:         Cognition and Memory: Cognition is impaired. Memory is impaired. Pertinent Laboratory/Diagnostic Studies: The following labs and studies were reviewed please see chart or hospital paperwork for details. STAT CBCD, CXR r/e pneumonia. O2 NC titrate to keep sat >92%. Xopenex nebs q8hr x7 days.     MOUSTAPHA Herron  7/12/2023 3:21 PM

## 2023-08-03 ENCOUNTER — NURSING HOME VISIT (OUTPATIENT)
Dept: FAMILY MEDICINE CLINIC | Facility: CLINIC | Age: 88
End: 2023-08-03
Payer: COMMERCIAL

## 2023-08-03 DIAGNOSIS — M41.86 LEVOSCOLIOSIS OF LUMBAR SPINE: ICD-10-CM

## 2023-08-03 DIAGNOSIS — I69.354 HEMIPARESIS AFFECTING LEFT SIDE AS LATE EFFECT OF CEREBROVASCULAR ACCIDENT (HCC): ICD-10-CM

## 2023-08-03 DIAGNOSIS — R26.9 NEUROLOGIC GAIT DYSFUNCTION: ICD-10-CM

## 2023-08-03 DIAGNOSIS — M41.84 DEXTROSCOLIOSIS OF THORACIC SPINE: ICD-10-CM

## 2023-08-03 DIAGNOSIS — F03.90 SENILE DEMENTIA, UNCOMPLICATED (HCC): Primary | ICD-10-CM

## 2023-08-03 DIAGNOSIS — R13.12 OROPHARYNGEAL DYSPHAGIA: ICD-10-CM

## 2023-08-03 PROCEDURE — 99309 SBSQ NF CARE MODERATE MDM 30: CPT | Performed by: NURSE PRACTITIONER

## 2023-08-03 NOTE — PROGRESS NOTES
[address]  2026 [facility], [address] [place] [address]  Facility: [facility]    NAME: Bud Lopez  AGE: 80 y.o. SEX: female    DATE OF ENCOUNTER: 8/3/2023    Code status:  Full Code    Assessment and Plan     1. Senile dementia, uncomplicated ([address])    2. Hemiparesis affecting left side as late effect of cerebrovascular accident ([address])    3. Dextroscoliosis of thoracic spine    4. Levoscoliosis of lumbar spine    5. Oropharyngeal dysphagia    6. Neurologic gait dysfunction        All medications and routine orders were reviewed and updated as needed. Plan discussed with: Patient, nursing staff    Chief Complaint     Follow-up of chronic conditions    History of Present Illness     Mae Bennett is assessed today for follow up. She was treated with antibiotics, nebulizers for pneumonia last month and has since recovered. She is oob to chair, in NAD. Appetite is returning, she has been titrated off O2 NC. Lungs are diminished but clear, no lingering cough. Bowels are moving, no s/s dysuria present. Healthy sleep hygiene. Afebrile, VSS. Mood is stable. The following portions of the patient's history were reviewed and updated as appropriate: current medications, past family history, past medical history, past social history, past surgical history and problem list.    Allergies:  No Known Allergies    Review of Systems     Review of Systems   Unable to perform ROS: Dementia   Constitutional: Positive for appetite change. Negative for activity change, chills, fatigue and fever. HENT: Positive for dental problem, hearing loss and trouble swallowing. Negative for congestion, ear pain, postnasal drip and sinus pain. Eyes: Negative. Respiratory: Negative. Negative for cough and shortness of breath. Cardiovascular: Negative. Negative for chest pain and leg swelling. Gastrointestinal: Negative. Negative for constipation and diarrhea. Endocrine: Negative.     Genitourinary: Negative. Negative for dysuria. Musculoskeletal: Positive for gait problem. Skin: Negative. Allergic/Immunologic: Negative. Negative for immunocompromised state. Neurological: Positive for speech difficulty and weakness. Negative for dizziness and light-headedness. Hematological: Negative. Psychiatric/Behavioral: Positive for confusion. Medications and orders     All medications reviewed and updated in [facility] E[facility]. Objective     Vitals: per nursing home records    Physical Exam  Vitals and nursing note reviewed. Constitutional:       General: She is awake. Appearance: Normal appearance. Comments: frail   HENT:      Head: Normocephalic and atraumatic. Right Ear: Tympanic membrane, ear canal and external ear normal.      Left Ear: Tympanic membrane, ear canal and external ear normal.      Nose: Nose normal.      Mouth/Throat:      Mouth: Mucous membranes are moist.      Pharynx: Oropharynx is clear. Eyes:      Extraocular Movements: Extraocular movements intact. Conjunctiva/sclera: Conjunctivae normal.      Pupils: Pupils are equal, round, and reactive to light. Cardiovascular:      Rate and Rhythm: Normal rate and regular rhythm. Pulses: Normal pulses. Heart sounds: Murmur heard. Pulmonary:      Effort: Pulmonary effort is normal.      Breath sounds: Normal breath sounds. Abdominal:      General: Bowel sounds are normal.      Palpations: Abdomen is soft. Musculoskeletal:         General: Deformity present. Normal range of motion. Cervical back: Normal range of motion and neck supple. Skin:     General: Skin is warm and dry. Neurological:      General: No focal deficit present. Mental Status: She is alert. Mental status is at baseline. She is confused. GCS: GCS eye subscore is 4. GCS verbal subscore is 4. GCS motor subscore is 6. Sensory: Sensory deficit present. Motor: Weakness present.       Gait: Gait abnormal. Comments: Left hemiparesis   Psychiatric:         Mood and Affect: Mood normal.         Speech: Speech is delayed. Behavior: Behavior is slowed. Behavior is cooperative. Cognition and Memory: Cognition is impaired. Memory is impaired. Judgment: Judgment is inappropriate. Pertinent Laboratory/Diagnostic Studies: The following labs and studies were reviewed please see chart or hospital paperwork for details.     Check CMP with Lipid panel scheduled for 10/26/23    Ilene Lanes, [address]  8/3/2023 12:14 PM

## 2023-09-01 ENCOUNTER — NURSING HOME VISIT (OUTPATIENT)
Dept: FAMILY MEDICINE CLINIC | Facility: CLINIC | Age: 88
End: 2023-09-01
Payer: COMMERCIAL

## 2023-09-01 DIAGNOSIS — I69.354 HEMIPARESIS AFFECTING LEFT SIDE AS LATE EFFECT OF CEREBROVASCULAR ACCIDENT (HCC): Primary | ICD-10-CM

## 2023-09-01 DIAGNOSIS — F32.5 MAJOR DEPRESSIVE DISORDER WITH SINGLE EPISODE, IN FULL REMISSION (HCC): ICD-10-CM

## 2023-09-01 DIAGNOSIS — F03.90 SENILE DEMENTIA, UNCOMPLICATED (HCC): ICD-10-CM

## 2023-09-01 DIAGNOSIS — M41.84 DEXTROSCOLIOSIS OF THORACIC SPINE: ICD-10-CM

## 2023-09-01 DIAGNOSIS — M41.86 LEVOSCOLIOSIS OF LUMBAR SPINE: ICD-10-CM

## 2023-09-01 DIAGNOSIS — R26.9 NEUROLOGIC GAIT DYSFUNCTION: ICD-10-CM

## 2023-09-01 PROCEDURE — 99309 SBSQ NF CARE MODERATE MDM 30: CPT | Performed by: NURSE PRACTITIONER

## 2023-09-01 NOTE — PROGRESS NOTES
[address]  2026 [facility], [address] [place] [address]  Facility: [facility]    NAME: Nic Chambers  AGE: 80 y.o. SEX: female    DATE OF ENCOUNTER: 9/1/2023    Code status:  Full Code    Assessment and Plan     1. Hemiparesis affecting left side as late effect of cerebrovascular accident ([address])    2. Senile dementia, uncomplicated ([address])    3. Dextroscoliosis of thoracic spine    4. Levoscoliosis of lumbar spine    5. Major depressive disorder with single episode, in full remission ([address])    6. Neurologic gait dysfunction        All medications and routine orders were reviewed and updated as needed. Plan discussed with: Patient, nursing staff    Chief Complaint     Follow-up of chronic conditions    History of Present Illness     Roque Meyers is assessed for follow up. She appears comfortable, oob to chair. She will answer most questions but will not vocalize most needs. She is able to follow commands. She denies pain, dyspnea. Appetite has returned to baseline, she is staying hydrated. Bowel pattern stable, no s/s dysuria. Healthy sleep hygiene. Mood is stable. Passively enjoys most community activities. Afebrile, VSS. Weight stable. The following portions of the patient's history were reviewed and updated as appropriate: current medications, past family history, past medical history, past social history, past surgical history and problem list.    Allergies:  No Known Allergies    Review of Systems     Review of Systems   Unable to perform ROS: Dementia   HENT: Positive for dental problem, hearing loss and trouble swallowing. Musculoskeletal: Positive for gait problem. Neurological: Positive for speech difficulty and weakness. Psychiatric/Behavioral: Positive for confusion. Negative for dysphoric mood and sleep disturbance. The patient is not nervous/anxious. Medications and orders     All medications reviewed and updated in long term EMR.       Objective Vitals: per nursing home records    Physical Exam  Vitals and nursing note reviewed. Constitutional:       Appearance: Normal appearance. HENT:      Head: Normocephalic and atraumatic. Right Ear: Tympanic membrane, ear canal and external ear normal.      Left Ear: Tympanic membrane, ear canal and external ear normal.      Nose: Nose normal.      Mouth/Throat:      Mouth: Mucous membranes are moist.      Pharynx: Oropharynx is clear. Eyes:      Extraocular Movements: Extraocular movements intact. Conjunctiva/sclera: Conjunctivae normal.      Pupils: Pupils are equal, round, and reactive to light. Cardiovascular:      Rate and Rhythm: Normal rate and regular rhythm. Pulses: Normal pulses. Heart sounds: Murmur heard. Pulmonary:      Effort: Pulmonary effort is normal.      Breath sounds: Normal breath sounds. Abdominal:      General: Bowel sounds are normal.      Palpations: Abdomen is soft. Musculoskeletal:         General: Deformity present. Normal range of motion. Cervical back: Normal range of motion and neck supple. Skin:     General: Skin is warm and dry. Neurological:      General: No focal deficit present. Mental Status: She is alert. Mental status is at baseline. She is confused. GCS: GCS eye subscore is 4. GCS verbal subscore is 4. GCS motor subscore is 6. Sensory: Sensory deficit present. Motor: Weakness present. Gait: Gait abnormal.      Comments: Left hemiparesis   Psychiatric:         Mood and Affect: Mood normal.         Speech: Speech is delayed. Behavior: Behavior is slowed. Behavior is cooperative. Cognition and Memory: Cognition is impaired. Memory is impaired. Judgment: Judgment is inappropriate. Pertinent Laboratory/Diagnostic Studies: The following labs and studies were reviewed please see chart or hospital paperwork for details.     Continue current medical management      Brianna Washburn MOUSTAPHA  9/1/2023 2:33 PM

## 2023-10-06 ENCOUNTER — NURSING HOME VISIT (OUTPATIENT)
Dept: FAMILY MEDICINE CLINIC | Facility: CLINIC | Age: 88
End: 2023-10-06
Payer: COMMERCIAL

## 2023-10-06 DIAGNOSIS — I69.354 HEMIPARESIS AFFECTING LEFT SIDE AS LATE EFFECT OF CEREBROVASCULAR ACCIDENT (HCC): Primary | ICD-10-CM

## 2023-10-06 DIAGNOSIS — R13.12 OROPHARYNGEAL DYSPHAGIA: ICD-10-CM

## 2023-10-06 DIAGNOSIS — E78.2 MIXED HYPERLIPIDEMIA: ICD-10-CM

## 2023-10-06 DIAGNOSIS — F03.90 SENILE DEMENTIA, UNCOMPLICATED (HCC): ICD-10-CM

## 2023-10-06 DIAGNOSIS — R26.9 NEUROLOGIC GAIT DYSFUNCTION: ICD-10-CM

## 2023-10-06 PROCEDURE — 99309 SBSQ NF CARE MODERATE MDM 30: CPT | Performed by: NURSE PRACTITIONER

## 2023-10-06 NOTE — PROGRESS NOTES
[address]  2026 [facility], [address] [place] [address]  Facility: [facility]    NAME: Vanessa   AGE: 80 y.o. SEX: female    DATE OF ENCOUNTER: 10/6/2023    Code status:  Full Code    Assessment and Plan     1. Hemiparesis affecting left side as late effect of cerebrovascular accident ([address])    2. Senile dementia, uncomplicated ([address])    3. Oropharyngeal dysphagia    4. Mixed hyperlipidemia    5. Neurologic gait dysfunction        All medications and routine orders were reviewed and updated as needed. Plan discussed with: Patient, nursing staff    Chief Complaint     Follow-up of chronic conditions    History of Present Illness     Emir Jenkins is assessed for follow up. She has suffered an overall decline in cognitive as well as physical function post pneumonia this summer. Therapy is working with her with goal of restoration/optimization. She appears comfortable, is calm/pleasant with exam.  Can follow commands, dependent for all ADLs. Appetite remains fair, staying hydrated. Bowel and bladder function at baseline. Sleeping well at night. Enjoys social activities as well as talking with staff one on one. Afebrile, VSS. Weight is stable. The following portions of the patient's history were reviewed and updated as appropriate: current medications, past family history, past medical history, past social history, past surgical history and problem list.    Allergies:  No Known Allergies    Review of Systems     Review of Systems   Unable to perform ROS: Dementia   HENT:  Positive for dental problem, hearing loss and trouble swallowing. Musculoskeletal:  Positive for gait problem. Neurological:  Positive for speech difficulty and weakness. Psychiatric/Behavioral:  Positive for confusion. Medications and orders     All medications reviewed and updated in long-term EMR.       Objective     Vitals: per nursing home records    Physical Exam  Vitals and nursing note reviewed. Constitutional:       General: She is awake. Appearance: Normal appearance. Comments: frail   HENT:      Head: Normocephalic and atraumatic. Right Ear: Tympanic membrane, ear canal and external ear normal.      Left Ear: Tympanic membrane, ear canal and external ear normal.      Nose: Nose normal.      Mouth/Throat:      Mouth: Mucous membranes are moist.      Pharynx: Oropharynx is clear. Eyes:      Extraocular Movements: Extraocular movements intact. Conjunctiva/sclera: Conjunctivae normal.      Pupils: Pupils are equal, round, and reactive to light. Cardiovascular:      Rate and Rhythm: Normal rate and regular rhythm. Pulses: Normal pulses. Heart sounds: Murmur heard. Pulmonary:      Effort: Pulmonary effort is normal.      Breath sounds: Normal breath sounds. Abdominal:      General: Bowel sounds are normal.      Palpations: Abdomen is soft. Musculoskeletal:         General: Deformity present. Normal range of motion. Cervical back: Normal range of motion and neck supple. Skin:     General: Skin is warm and dry. Neurological:      General: No focal deficit present. Mental Status: She is alert. Mental status is at baseline. She is confused. GCS: GCS eye subscore is 4. GCS verbal subscore is 4. GCS motor subscore is 6. Sensory: Sensory deficit present. Motor: Weakness present. Gait: Gait abnormal.      Comments: Mild left hemiparesis   Psychiatric:         Mood and Affect: Mood normal.         Speech: Speech is delayed. Behavior: Behavior is slowed. Behavior is cooperative. Cognition and Memory: Cognition is impaired. Memory is impaired. Judgment: Judgment is inappropriate. Pertinent Laboratory/Diagnostic Studies: The following labs and studies were reviewed please see chart or hospital paperwork for details.     Continue current medical management    MOUSTAPHA Talamantes  10/6/2023 6:09 PM

## 2023-11-06 ENCOUNTER — NURSING HOME VISIT (OUTPATIENT)
Dept: FAMILY MEDICINE CLINIC | Facility: CLINIC | Age: 88
End: 2023-11-06
Payer: COMMERCIAL

## 2023-11-06 DIAGNOSIS — I10 PRIMARY HYPERTENSION: ICD-10-CM

## 2023-11-06 DIAGNOSIS — F03.90 SENILE DEMENTIA, UNCOMPLICATED (HCC): ICD-10-CM

## 2023-11-06 DIAGNOSIS — R26.9 NEUROLOGIC GAIT DYSFUNCTION: ICD-10-CM

## 2023-11-06 DIAGNOSIS — I69.354 HEMIPARESIS AFFECTING LEFT SIDE AS LATE EFFECT OF CEREBROVASCULAR ACCIDENT (HCC): Primary | ICD-10-CM

## 2023-11-06 DIAGNOSIS — M41.86 LEVOSCOLIOSIS OF LUMBAR SPINE: ICD-10-CM

## 2023-11-06 DIAGNOSIS — M41.84 DEXTROSCOLIOSIS OF THORACIC SPINE: ICD-10-CM

## 2023-11-06 PROCEDURE — 99309 SBSQ NF CARE MODERATE MDM 30: CPT | Performed by: NURSE PRACTITIONER

## 2023-11-06 NOTE — PROGRESS NOTES
[address]  2026 [facility], [address] [facility]  Facility: [facility]    NAME: Liliane Canchola  AGE: 80 y.o. SEX: female    DATE OF ENCOUNTER: 11/6/2023    Code status:  Full Code    Assessment and Plan     1. Hemiparesis affecting left side as late effect of cerebrovascular accident ([address])    2. Senile dementia, uncomplicated ([address])    3. Dextroscoliosis of thoracic spine    4. Levoscoliosis of lumbar spine    5. Primary hypertension    6. Neurologic gait dysfunction        All medications and routine orders were reviewed and updated as needed. Plan discussed with: Patient, nursing staff    Chief Complaint     Follow-up of chronic conditions    History of Present Illness     Ms. Jadyn Martinez is assessed for follow up. Her blood pressures have been noted to be elevated over the past month without hx HTN. 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 Has been maintained on ASA and statin since. ROS difficult due to impaired cognition, however she appears comfortable, denies HA nor pain. Appetite is fair but stable, staying hydrated. Bowel and bladder function at baseline. She sleeps well at night. Mood is stable. Afebrile, HR 55-70. Appears euvolemic. The following portions of the patient's history were reviewed and updated as appropriate: current medications, past family history, past medical history, past social history, past surgical history and problem list.    Allergies:  No Known Allergies    Review of Systems     Review of Systems   Unable to perform ROS: Dementia   HENT:  Positive for dental problem, hearing loss and trouble swallowing. Musculoskeletal:  Positive for gait problem. Neurological:  Positive for speech difficulty and weakness. Psychiatric/Behavioral:  Positive for confusion. Medications and orders     All medications reviewed and updated in [facility] E[facility].       Objective     Vitals: per nursing home records    Physical Exam  Vitals and nursing note reviewed. Constitutional:       General: She is awake. Appearance: Normal appearance. Comments: frail   HENT:      Head: Normocephalic and atraumatic. Right Ear: Tympanic membrane, ear canal and external ear normal.      Left Ear: Tympanic membrane, ear canal and external ear normal.      Nose: Nose normal.      Mouth/Throat:      Mouth: Mucous membranes are moist.      Pharynx: Oropharynx is clear. Eyes:      Extraocular Movements: Extraocular movements intact. Conjunctiva/sclera: Conjunctivae normal.      Pupils: Pupils are equal, round, and reactive to light. Cardiovascular:      Rate and Rhythm: Normal rate and regular rhythm. Pulses: Normal pulses. Heart sounds: Murmur heard. Pulmonary:      Effort: Pulmonary effort is normal.      Breath sounds: Normal breath sounds. Comments: Diminished but clear  Abdominal:      General: Bowel sounds are normal.      Palpations: Abdomen is soft. Musculoskeletal:         General: Deformity present. Normal range of motion. Cervical back: Normal range of motion and neck supple. Comments: kyphoscoliosis   Skin:     General: Skin is warm and dry. Coloration: Skin is pale. Neurological:      General: No focal deficit present. Mental Status: She is alert. She is confused. GCS: GCS eye subscore is 4. GCS verbal subscore is 4. GCS motor subscore is 6. Sensory: Sensory deficit present. Motor: Weakness present. Gait: Gait abnormal.      Comments: Mild left hemiparesis   Psychiatric:         Mood and Affect: Mood normal.         Speech: Speech is delayed. Behavior: Behavior is slowed. Behavior is cooperative. Thought Content: Thought content normal.         Cognition and Memory: Cognition is impaired. Memory is impaired. Judgment: Judgment is inappropriate. Pertinent Laboratory/Diagnostic Studies:      The following labs and studies were reviewed please see chart or hospital paperwork for details. Telmisartan 40mg po daily.      MOUSTAPHA Fam  11/6/2023 3:38 PM

## 2023-12-04 ENCOUNTER — NURSING HOME VISIT (OUTPATIENT)
Dept: FAMILY MEDICINE CLINIC | Facility: CLINIC | Age: 88
End: 2023-12-04
Payer: COMMERCIAL

## 2023-12-04 DIAGNOSIS — I69.354 HEMIPARESIS AFFECTING LEFT SIDE AS LATE EFFECT OF CEREBROVASCULAR ACCIDENT (HCC): Primary | ICD-10-CM

## 2023-12-04 DIAGNOSIS — I10 PRIMARY HYPERTENSION: ICD-10-CM

## 2023-12-04 DIAGNOSIS — M41.25 OTHER IDIOPATHIC SCOLIOSIS, THORACOLUMBAR REGION: ICD-10-CM

## 2023-12-04 DIAGNOSIS — R26.9 NEUROLOGIC GAIT DYSFUNCTION: ICD-10-CM

## 2023-12-04 DIAGNOSIS — F03.90 SENILE DEMENTIA, UNCOMPLICATED (HCC): ICD-10-CM

## 2023-12-04 PROBLEM — Z86.73 HISTORY OF CVA (CEREBROVASCULAR ACCIDENT): Status: ACTIVE | Noted: 2018-07-17

## 2023-12-04 PROCEDURE — 99309 SBSQ NF CARE MODERATE MDM 30: CPT | Performed by: NURSE PRACTITIONER

## 2023-12-04 NOTE — PROGRESS NOTES
[address]  [address] [facility], [address] [place] [address]  Facility: [facility]    NAME: Ari Monroy  AGE: 80 y.o. SEX: female    DATE OF ENCOUNTER: 12/4/2023    Code status:  Full Code    Assessment and Plan     1. Hemiparesis affecting left side as late effect of cerebrovascular accident ([address])    2. Senile dementia, uncomplicated ([address])    3. Other idiopathic scoliosis, thoracolumbar region    4. Primary hypertension    5. Neurologic gait dysfunction        All medications and routine orders were reviewed and updated as needed. Plan discussed with: Patient, nursing staff    Chief Complaint     Follow-up of chronic conditions    History of Present Illness     Ms. Aicha Khan is assessed for follow up. Bp improved with low dose telmisartan added last month. She is oob to chair today, NAD. Is able to follow most commands however has difficulty making most needs known. Appetite stable, stays hydrated. Bowel and bladder function at baseline. Sleeping well at night. Passively engages in community life activities. Afebrile, VSS. No recent falls, no concerns from nursing standpoint. The following portions of the patient's history were reviewed and updated as appropriate: current medications, past family history, past medical history, past social history, past surgical history and problem list.    Allergies:  No Known Allergies    Review of Systems     Review of Systems   Unable to perform ROS: Dementia   Constitutional:  Negative for activity change, appetite change and unexpected weight change. HENT:  Positive for dental problem, hearing loss and trouble swallowing. Musculoskeletal:  Positive for gait problem. Neurological:  Positive for speech difficulty and weakness. Psychiatric/Behavioral:  Positive for confusion. Medications and orders     All medications reviewed and updated in intermediate EMR.       Objective     Vitals: per nursing home records    Physical Exam  Vitals and nursing note reviewed. Constitutional:       General: She is awake. Appearance: Normal appearance. Comments: frail   HENT:      Head: Normocephalic and atraumatic. Right Ear: Tympanic membrane, ear canal and external ear normal. Decreased hearing noted. Left Ear: Tympanic membrane, ear canal and external ear normal. Decreased hearing noted. Nose: Nose normal.      Mouth/Throat:      Mouth: Mucous membranes are moist.      Dentition: Abnormal dentition. Pharynx: Oropharynx is clear. Eyes:      Extraocular Movements: Extraocular movements intact. Conjunctiva/sclera: Conjunctivae normal.      Pupils: Pupils are equal, round, and reactive to light. Cardiovascular:      Rate and Rhythm: Normal rate and regular rhythm. Pulses: Normal pulses. Heart sounds: Murmur heard. Pulmonary:      Effort: Pulmonary effort is normal.      Breath sounds: Normal breath sounds. Abdominal:      General: Bowel sounds are normal.      Palpations: Abdomen is soft. Musculoskeletal:         General: Deformity present. Normal range of motion. Cervical back: Normal range of motion and neck supple. Comments: kyphoscoliosis   Skin:     General: Skin is warm and dry. Coloration: Skin is pale. Neurological:      General: No focal deficit present. Mental Status: She is alert. Mental status is at baseline. She is confused. GCS: GCS eye subscore is 4. GCS verbal subscore is 4. GCS motor subscore is 6. Sensory: Sensory deficit present. Motor: Weakness present. Gait: Gait abnormal.      Comments: Left hemiparesis   Psychiatric:         Mood and Affect: Mood normal.         Speech: Speech is delayed. Behavior: Behavior is slowed. Behavior is cooperative. Thought Content: Thought content normal.         Cognition and Memory: Cognition is impaired. Memory is impaired. Judgment: Judgment is inappropriate. Pertinent Laboratory/Diagnostic Studies: The following labs and studies were reviewed please see chart or hospital paperwork for details.     Continue current medical management    MOUSTAPHA Johnson  12/4/2023 4:08 PM

## 2024-01-05 ENCOUNTER — NURSING HOME VISIT (OUTPATIENT)
Dept: FAMILY MEDICINE CLINIC | Facility: CLINIC | Age: 89
End: 2024-01-05
Payer: COMMERCIAL

## 2024-01-05 DIAGNOSIS — R06.2 BILATERAL WHEEZING: ICD-10-CM

## 2024-01-05 DIAGNOSIS — M41.86 LEVOSCOLIOSIS OF LUMBAR SPINE: ICD-10-CM

## 2024-01-05 DIAGNOSIS — I69.354 HEMIPARESIS AFFECTING LEFT SIDE AS LATE EFFECT OF CEREBROVASCULAR ACCIDENT (HCC): Primary | ICD-10-CM

## 2024-01-05 DIAGNOSIS — R13.12 OROPHARYNGEAL DYSPHAGIA: ICD-10-CM

## 2024-01-05 DIAGNOSIS — I10 PRIMARY HYPERTENSION: ICD-10-CM

## 2024-01-05 DIAGNOSIS — M41.84 DEXTROSCOLIOSIS OF THORACIC SPINE: ICD-10-CM

## 2024-01-05 DIAGNOSIS — F03.90 SENILE DEMENTIA, UNCOMPLICATED (HCC): ICD-10-CM

## 2024-01-05 PROCEDURE — 99309 SBSQ NF CARE MODERATE MDM 30: CPT | Performed by: NURSE PRACTITIONER

## 2024-01-05 NOTE — PROGRESS NOTES
[facility]  [address] [place] [address]  Facility: [facility]    NAME: Bailey Marrufo  AGE: 89 y.o. SEX: female    DATE OF ENCOUNTER: 1/5/2024    Code status:  Full Code    Assessment and Plan     1. Hemiparesis affecting left side as late effect of cerebrovascular accident (HCC)    2. Senile dementia, uncomplicated (HCC)    3. Dextroscoliosis of thoracic spine    4. Levoscoliosis of lumbar spine    5. Primary hypertension    6. Oropharyngeal dysphagia    7. Bilateral wheezing        All medications and routine orders were reviewed and updated as needed.    Plan discussed with: Patient, nursing staff    Chief Complaint     Follow-up of chronic conditions    History of Present Illness     Ms. Marrufo is assessed for follow up.  She is noted to have wheezes throughout all lung fields without respiratory distress/cough/congestion.  No dx COPD/Asthma associated.  There is a viral infection currently spreading on her unit, she shows no s/s infection at this time.  COVID negative.  Appetite unchanged, stays hydrated.  Bowel and bladder function at baseline.  Sleeping well at night.  Afebrile, VSS.      The following portions of the patient's history were reviewed and updated as appropriate: current medications, past family history, past medical history, past social history, past surgical history and problem list.    Allergies:  No Known Allergies    Review of Systems     Review of Systems   Unable to perform ROS: Dementia   HENT:  Positive for dental problem, hearing loss and trouble swallowing.    Respiratory:  Positive for wheezing.    Musculoskeletal:  Positive for gait problem.   Neurological:  Positive for speech difficulty and weakness.   Psychiatric/Behavioral:  Positive for confusion.        Medications and orders     All medications reviewed and updated in residential EMR.      Objective     Vitals: per nursing home records    Physical Exam  Vitals and nursing note  reviewed.   Constitutional:       General: She is awake.      Appearance: Normal appearance.      Comments: frail   HENT:      Head: Normocephalic and atraumatic.      Right Ear: Tympanic membrane, ear canal and external ear normal. Decreased hearing noted.      Left Ear: Tympanic membrane, ear canal and external ear normal. Decreased hearing noted.      Nose: Nose normal.      Mouth/Throat:      Mouth: Mucous membranes are moist.      Dentition: Abnormal dentition.      Pharynx: Oropharynx is clear.   Eyes:      Extraocular Movements: Extraocular movements intact.      Conjunctiva/sclera: Conjunctivae normal.      Pupils: Pupils are equal, round, and reactive to light.   Cardiovascular:      Rate and Rhythm: Regular rhythm. Bradycardia present.      Pulses: Normal pulses.      Heart sounds: Murmur heard.   Pulmonary:      Effort: Pulmonary effort is normal.      Breath sounds: Wheezing present. No rhonchi or rales.   Abdominal:      General: Bowel sounds are normal.      Palpations: Abdomen is soft.   Musculoskeletal:         General: Deformity present. Normal range of motion.      Cervical back: Normal range of motion and neck supple.      Comments: kyphoscoliosis   Skin:     General: Skin is warm and dry.      Coloration: Skin is pale.   Neurological:      General: No focal deficit present.      Mental Status: She is alert. She is confused.      GCS: GCS eye subscore is 4. GCS verbal subscore is 4. GCS motor subscore is 6.      Sensory: Sensory deficit present.      Motor: Weakness present.      Gait: Gait abnormal.      Comments: Left hemiparesis   Psychiatric:         Mood and Affect: Mood normal.         Speech: Speech is delayed.         Behavior: Behavior is slowed. Behavior is cooperative.         Cognition and Memory: Cognition is impaired. Memory is impaired.         Judgment: Judgment is inappropriate.         Pertinent Laboratory/Diagnostic Studies:     The following labs and studies were reviewed  please see chart or hospital paperwork for details.    Start xopenex nebs q8hr ATC then prn for wheeze/dyspnea.  Continue to monitor for s/s infection.     MOUSTAPHA Senior  1/5/2024 5:24 PM

## 2024-02-05 ENCOUNTER — NURSING HOME VISIT (OUTPATIENT)
Dept: FAMILY MEDICINE CLINIC | Facility: CLINIC | Age: 89
End: 2024-02-05
Payer: COMMERCIAL

## 2024-02-05 DIAGNOSIS — R13.12 OROPHARYNGEAL DYSPHAGIA: ICD-10-CM

## 2024-02-05 DIAGNOSIS — I10 PRIMARY HYPERTENSION: ICD-10-CM

## 2024-02-05 DIAGNOSIS — R26.9 NEUROLOGIC GAIT DYSFUNCTION: ICD-10-CM

## 2024-02-05 DIAGNOSIS — Z86.73 HISTORY OF CVA (CEREBROVASCULAR ACCIDENT): ICD-10-CM

## 2024-02-05 DIAGNOSIS — F03.90 SENILE DEMENTIA, UNCOMPLICATED (HCC): Primary | ICD-10-CM

## 2024-02-05 PROCEDURE — 99309 SBSQ NF CARE MODERATE MDM 30: CPT | Performed by: NURSE PRACTITIONER

## 2024-02-05 NOTE — PROGRESS NOTES
[facility]  [address] [place] [address]  Facility: [facility]    NAME: Bailey Marrufo  AGE: 89 y.o. SEX: female    DATE OF ENCOUNTER: 2/5/2024    Code status:  Full Code    Assessment and Plan     1. Senile dementia, uncomplicated (HCC)    2. History of CVA (cerebrovascular accident)    3. Primary hypertension    4. Oropharyngeal dysphagia    5. Neurologic gait dysfunction        All medications and routine orders were reviewed and updated as needed.    Plan discussed with: Patient, nursing staff    Chief Complaint     Follow-up of chronic conditions    History of Present Illness     Ms. Marrufo is assessed for follow up. She appears comfortable, oob to chair in NAD.  Her mood is pleasant and interactive.  She is able to follow commands but does not make needs known.  Her appetite is fair, weight is stable.  She stays hydrated.  Bowel and bladder function is at baseline.  Sleeps well at night.  Enjoys socializing with staff members and will engage passively at social events.  Afebrile, VSS.      The following portions of the patient's history were reviewed and updated as appropriate: current medications, past family history, past medical history, past social history, past surgical history and problem list.    Allergies:  No Known Allergies    Review of Systems     Review of Systems   Unable to perform ROS: Dementia   Constitutional:  Negative for appetite change and unexpected weight change.   HENT:  Positive for dental problem, hearing loss and trouble swallowing.    Cardiovascular:  Positive for leg swelling.   Musculoskeletal:  Positive for gait problem.   Neurological:  Positive for weakness.   Psychiatric/Behavioral:  Positive for confusion.        Medications and orders     All medications reviewed and updated in longterm EMR.      Objective     Vitals: per nursing home records    Physical Exam  Vitals and nursing note reviewed.   Constitutional:       General: She is  awake.      Appearance: Normal appearance.      Comments: frail   HENT:      Head: Normocephalic and atraumatic.      Right Ear: Tympanic membrane, ear canal and external ear normal. Decreased hearing noted.      Left Ear: Tympanic membrane, ear canal and external ear normal. Decreased hearing noted.      Nose: Nose normal.      Mouth/Throat:      Mouth: Mucous membranes are moist.      Dentition: Abnormal dentition.      Pharynx: Oropharynx is clear.   Eyes:      Extraocular Movements: Extraocular movements intact.      Conjunctiva/sclera: Conjunctivae normal.      Pupils: Pupils are equal, round, and reactive to light.   Cardiovascular:      Rate and Rhythm: Normal rate and regular rhythm.      Pulses: Normal pulses.      Heart sounds: Murmur heard.   Pulmonary:      Effort: Pulmonary effort is normal.      Breath sounds: Normal breath sounds.   Abdominal:      General: Bowel sounds are normal.      Palpations: Abdomen is soft.   Musculoskeletal:         General: Deformity present. Normal range of motion.      Cervical back: Normal range of motion and neck supple.      Right lower leg: Edema present.      Left lower leg: Edema present.      Comments: Trace ble edema with legs dependent  kyphoscoliosis   Skin:     General: Skin is warm and dry.      Coloration: Skin is pale.   Neurological:      General: No focal deficit present.      Mental Status: She is alert. She is confused.      GCS: GCS eye subscore is 4. GCS verbal subscore is 4. GCS motor subscore is 6.      Sensory: Sensory deficit present.      Motor: Weakness present.      Gait: Gait abnormal.      Comments: Left hemiparesis   Psychiatric:         Mood and Affect: Mood normal.         Speech: Speech is delayed.         Behavior: Behavior is slowed. Behavior is cooperative.         Cognition and Memory: Cognition is impaired. Memory is impaired.         Judgment: Judgment normal.         Pertinent Laboratory/Diagnostic Studies:     The following labs and  studies were reviewed please see chart or hospital paperwork for details.    Continue current medical management    MOUSTAPHA Senior  2/5/2024 4:52 PM

## 2024-03-01 ENCOUNTER — NURSING HOME VISIT (OUTPATIENT)
Dept: FAMILY MEDICINE CLINIC | Facility: CLINIC | Age: 89
End: 2024-03-01
Payer: COMMERCIAL

## 2024-03-01 DIAGNOSIS — R13.12 OROPHARYNGEAL DYSPHAGIA: ICD-10-CM

## 2024-03-01 DIAGNOSIS — M41.86 LEVOSCOLIOSIS OF LUMBAR SPINE: ICD-10-CM

## 2024-03-01 DIAGNOSIS — R26.9 NEUROLOGIC GAIT DYSFUNCTION: ICD-10-CM

## 2024-03-01 DIAGNOSIS — I69.354 HEMIPARESIS AFFECTING LEFT SIDE AS LATE EFFECT OF CEREBROVASCULAR ACCIDENT (HCC): ICD-10-CM

## 2024-03-01 DIAGNOSIS — I10 PRIMARY HYPERTENSION: ICD-10-CM

## 2024-03-01 DIAGNOSIS — F03.90 SENILE DEMENTIA, UNCOMPLICATED (HCC): Primary | ICD-10-CM

## 2024-03-01 DIAGNOSIS — M41.84 DEXTROSCOLIOSIS OF THORACIC SPINE: ICD-10-CM

## 2024-03-01 PROCEDURE — 99309 SBSQ NF CARE MODERATE MDM 30: CPT | Performed by: NURSE PRACTITIONER

## 2024-03-01 NOTE — PROGRESS NOTES
[facility]  [address] [place] [address]  Facility: [facility]    NAME: Bailey Marrufo  AGE: 89 y.o. SEX: female    DATE OF ENCOUNTER: 3/1/2024    Code status:  Full Code    Assessment and Plan     1. Senile dementia, uncomplicated (HCC)    2. Hemiparesis affecting left side as late effect of cerebrovascular accident (HCC)    3. Primary hypertension    4. Oropharyngeal dysphagia    5. Dextroscoliosis of thoracic spine    6. Levoscoliosis of lumbar spine    7. Neurologic gait dysfunction        All medications and routine orders were reviewed and updated as needed.    Plan discussed with: Patient, nursing staff    Chief Complaint     Follow-up of chronic conditions    History of Present Illness     Ms. Marrufo is assessed for follow up. She is in good spirits, laughing with the staff members.  Calm, pleasantly confused.  ROS difficult due to impaired cognition.  Appetite fair, nursing notes if she is fed her intake is better.  Stays hydrated.  Weight stable.  Bowel and bladder function at baseline.  Healthy sleep hygiene, however noted to nap occasionally throughout the day if not engaged in conversations.  Afebrile, VSS.     The following portions of the patient's history were reviewed and updated as appropriate: current medications, past family history, past medical history, past social history, past surgical history and problem list.    Allergies:  No Known Allergies    Review of Systems     Review of Systems   Unable to perform ROS: Dementia   HENT:  Positive for dental problem, hearing loss and trouble swallowing.    Musculoskeletal:  Positive for gait problem.   Neurological:  Positive for weakness.   Psychiatric/Behavioral:  Positive for confusion.        Medications and orders     All medications reviewed and updated in custodial EMR.      Objective     Vitals: per nursing home records    Physical Exam  Vitals and nursing note reviewed.   Constitutional:       General:  She is awake.      Appearance: Normal appearance.      Comments: frail   HENT:      Head: Normocephalic and atraumatic.      Right Ear: Tympanic membrane, ear canal and external ear normal. Decreased hearing noted.      Left Ear: Tympanic membrane, ear canal and external ear normal. Decreased hearing noted.      Nose: Nose normal.      Mouth/Throat:      Mouth: Mucous membranes are moist.      Dentition: Abnormal dentition.      Pharynx: Oropharynx is clear.   Eyes:      Extraocular Movements: Extraocular movements intact.      Conjunctiva/sclera: Conjunctivae normal.      Pupils: Pupils are equal, round, and reactive to light.   Cardiovascular:      Rate and Rhythm: Normal rate and regular rhythm.      Pulses: Normal pulses.      Heart sounds: Murmur heard.   Pulmonary:      Effort: Pulmonary effort is normal.      Breath sounds: Normal breath sounds.   Abdominal:      General: Bowel sounds are normal.      Palpations: Abdomen is soft.   Musculoskeletal:         General: Deformity present. Normal range of motion.      Cervical back: Normal range of motion and neck supple.      Comments: Kyphoscoliosis     Skin:     General: Skin is warm and dry.      Coloration: Skin is pale.   Neurological:      General: No focal deficit present.      Mental Status: She is alert. Mental status is at baseline. She is confused.      GCS: GCS eye subscore is 4. GCS verbal subscore is 4. GCS motor subscore is 6.      Sensory: Sensory deficit present.      Motor: Weakness present.      Gait: Gait abnormal.      Comments: Left hemiparesis   Psychiatric:         Mood and Affect: Mood normal.         Speech: Speech is delayed.         Behavior: Behavior is slowed. Behavior is cooperative.         Thought Content: Thought content normal.         Cognition and Memory: Cognition is impaired. Memory is impaired.         Judgment: Judgment is inappropriate.         Pertinent Laboratory/Diagnostic Studies:     The following labs and studies  were reviewed please see chart or hospital paperwork for details.    Continue current medical management    MOUSTAPHA Senior  3/1/2024 4:11 PM

## 2024-04-01 ENCOUNTER — NURSING HOME VISIT (OUTPATIENT)
Dept: FAMILY MEDICINE CLINIC | Facility: CLINIC | Age: 89
End: 2024-04-01
Payer: COMMERCIAL

## 2024-04-01 DIAGNOSIS — F03.90 SENILE DEMENTIA, UNCOMPLICATED (HCC): Primary | ICD-10-CM

## 2024-04-01 DIAGNOSIS — I10 PRIMARY HYPERTENSION: ICD-10-CM

## 2024-04-01 DIAGNOSIS — E78.2 MIXED HYPERLIPIDEMIA: ICD-10-CM

## 2024-04-01 DIAGNOSIS — M41.84 DEXTROSCOLIOSIS OF THORACIC SPINE: ICD-10-CM

## 2024-04-01 DIAGNOSIS — M41.86 LEVOSCOLIOSIS OF LUMBAR SPINE: ICD-10-CM

## 2024-04-01 DIAGNOSIS — I69.354 HEMIPARESIS AFFECTING LEFT SIDE AS LATE EFFECT OF CEREBROVASCULAR ACCIDENT (HCC): ICD-10-CM

## 2024-04-01 PROCEDURE — 99309 SBSQ NF CARE MODERATE MDM 30: CPT | Performed by: NURSE PRACTITIONER

## 2024-04-01 NOTE — PROGRESS NOTES
[facility]  [address] [place] [address]  Facility: [facility]    NAME: Bailey Marrufo  AGE: 89 y.o. SEX: female    DATE OF ENCOUNTER: 4/1/2024    Code status:  Full Code    Assessment and Plan     1. Senile dementia, uncomplicated (HCC)    2. Hemiparesis affecting left side as late effect of cerebrovascular accident (HCC)    3. Primary hypertension    4. Mixed hyperlipidemia    5. Dextroscoliosis of thoracic spine    6. Levoscoliosis of lumbar spine        All medications and routine orders were reviewed and updated as needed.    Plan discussed with: Patient, nursing staff    Chief Complaint     Follow-up of chronic conditions    History of Present Illness     Ms. Marrufo is assessed for follow up.  She is oob to chair, NAD.  ROS difficult due to impaired cognition, however able to follow most commands when prompted.  Appetite stable, needs assistance to ensure proper intake.  Stays hydrated.  Bowels moving, no s/s dysuria present.  Sleeps well at night.  Enjoys conversations with staff members and will passively engage in social events on the unit.  Afebrile, VSS.  No concerns from nursing standpoint.     The following portions of the patient's history were reviewed and updated as appropriate: current medications, past family history, past medical history, past social history, past surgical history and problem list.    Allergies:  No Known Allergies    Review of Systems     Review of Systems   Unable to perform ROS: Dementia   HENT:  Positive for dental problem, hearing loss and trouble swallowing.    Musculoskeletal:  Positive for gait problem.   Neurological:  Positive for speech difficulty and weakness.   Psychiatric/Behavioral:  Positive for confusion.        Medications and orders     All medications reviewed and updated in halfway EMR.      Objective     Vitals: per nursing home records    Physical Exam  Vitals and nursing note reviewed.   Constitutional:        General: She is awake.      Appearance: Normal appearance.      Comments: frail   HENT:      Head: Normocephalic and atraumatic.      Right Ear: Tympanic membrane, ear canal and external ear normal. Decreased hearing noted.      Left Ear: Tympanic membrane, ear canal and external ear normal. Decreased hearing noted.      Nose: Nose normal.      Mouth/Throat:      Mouth: Mucous membranes are moist.      Dentition: Abnormal dentition.      Pharynx: Oropharynx is clear.   Eyes:      Extraocular Movements: Extraocular movements intact.      Conjunctiva/sclera: Conjunctivae normal.      Pupils: Pupils are equal, round, and reactive to light.   Cardiovascular:      Rate and Rhythm: Normal rate and regular rhythm.      Pulses: Normal pulses.      Heart sounds: Murmur heard.      Comments: Occasional ectopy  Pulmonary:      Effort: Pulmonary effort is normal.      Breath sounds: Normal breath sounds.   Abdominal:      General: Bowel sounds are normal.      Palpations: Abdomen is soft.   Musculoskeletal:         General: No deformity. Normal range of motion.      Cervical back: Normal range of motion and neck supple.      Comments: kyphoscoliosis   Skin:     General: Skin is warm and dry.      Coloration: Skin is pale.   Neurological:      General: No focal deficit present.      Mental Status: She is alert. She is confused.      GCS: GCS eye subscore is 4. GCS verbal subscore is 4. GCS motor subscore is 6.      Sensory: Sensory deficit present.      Motor: Weakness present.      Gait: Gait abnormal.      Comments: Left hemiparesis     Psychiatric:         Mood and Affect: Mood normal.         Speech: Speech is delayed.         Behavior: Behavior is slowed. Behavior is cooperative.         Cognition and Memory: Cognition is impaired. Memory is impaired.         Judgment: Judgment is inappropriate.         Pertinent Laboratory/Diagnostic Studies:     The following labs and studies were reviewed please see chart or hospital  paperwork for details.    Continue current medical management    MOUSTAPHA Senior  4/1/2024 4:14 PM

## 2024-05-03 ENCOUNTER — NURSING HOME VISIT (OUTPATIENT)
Dept: FAMILY MEDICINE CLINIC | Facility: CLINIC | Age: 89
End: 2024-05-03
Payer: COMMERCIAL

## 2024-05-03 DIAGNOSIS — I10 PRIMARY HYPERTENSION: ICD-10-CM

## 2024-05-03 DIAGNOSIS — E78.2 MIXED HYPERLIPIDEMIA: ICD-10-CM

## 2024-05-03 DIAGNOSIS — Z86.73 HISTORY OF CVA (CEREBROVASCULAR ACCIDENT): ICD-10-CM

## 2024-05-03 DIAGNOSIS — F03.90 SENILE DEMENTIA, UNCOMPLICATED (HCC): Primary | ICD-10-CM

## 2024-05-03 DIAGNOSIS — R26.9 NEUROLOGIC GAIT DYSFUNCTION: ICD-10-CM

## 2024-05-03 PROCEDURE — 99309 SBSQ NF CARE MODERATE MDM 30: CPT | Performed by: NURSE PRACTITIONER

## 2024-05-03 NOTE — PROGRESS NOTES
[facility]  [address] [place] [address]  Facility: [facility]    NAME: Bailey Marrufo  AGE: 89 y.o. SEX: female    DATE OF ENCOUNTER: 5/3/2024    Code status:  Full Code    Assessment and Plan     1. Senile dementia, uncomplicated (HCC)    2. History of CVA (cerebrovascular accident)    3. Primary hypertension    4. Mixed hyperlipidemia    5. Neurologic gait dysfunction        All medications and routine orders were reviewed and updated as needed.    Plan discussed with: Patient, nursing staff    Chief Complaint     Follow-up of chronic conditions    History of Present Illness     Ms. Marrufo is assessed for follow up.  She is pleasantly confused, happy to follow commands with delayed verbal responses to my questions.  Denies pain, appears comfortable oob to chair.  Appetite unchanged, stays hydrated.  Incontinent of bowel/bladder sufficient amounts.  Sleeps well at night with occasional nap during the day.  Passively engages in social events.  Afebrile, VSS.       The following portions of the patient's history were reviewed and updated as appropriate: current medications, past family history, past medical history, past social history, past surgical history and problem list.    Allergies:  No Known Allergies    Review of Systems     Review of Systems   Unable to perform ROS: Dementia   Constitutional:  Negative for activity change, appetite change and unexpected weight change.   HENT:  Positive for dental problem, hearing loss and trouble swallowing.    Respiratory:  Negative for shortness of breath.    Cardiovascular:  Negative for leg swelling.   Gastrointestinal:  Negative for constipation and diarrhea.   Genitourinary:  Negative for difficulty urinating.   Musculoskeletal:  Positive for gait problem.   Skin:  Positive for pallor.   Neurological:  Positive for speech difficulty and weakness.   Psychiatric/Behavioral:  Positive for confusion. Negative for sleep disturbance.         Medications and orders     [facility]      Objective     Vitals: per nursing home records    Physical Exam  Vitals and nursing note reviewed.   Constitutional:       General: She is awake.      Appearance: Normal appearance.      Comments: frail   HENT:      Head: Normocephalic and atraumatic.      Right Ear: Tympanic membrane, ear canal and external ear normal. Decreased hearing noted.      Left Ear: Tympanic membrane, ear canal and external ear normal. Decreased hearing noted.      Nose: Nose normal.      Mouth/Throat:      Mouth: Mucous membranes are moist.      Dentition: Abnormal dentition.      Pharynx: Oropharynx is clear.   Eyes:      Extraocular Movements: Extraocular movements intact.      Conjunctiva/sclera: Conjunctivae normal.      Pupils: Pupils are equal, round, and reactive to light.   Cardiovascular:      Rate and Rhythm: Normal rate and regular rhythm.      Pulses: Normal pulses.      Heart sounds: Murmur heard.   Pulmonary:      Effort: Pulmonary effort is normal.      Breath sounds: Normal breath sounds.   Abdominal:      General: Bowel sounds are normal.      Palpations: Abdomen is soft.   Musculoskeletal:         General: Deformity present. Normal range of motion.      Cervical back: Normal range of motion and neck supple.      Right lower leg: No edema.      Left lower leg: No edema.      Comments: kyphoscoliosis   Skin:     General: Skin is warm and dry.      Coloration: Skin is pale.   Neurological:      General: No focal deficit present.      Mental Status: She is alert. Mental status is at baseline. She is confused.      GCS: GCS eye subscore is 4. GCS verbal subscore is 4. GCS motor subscore is 6.      Sensory: Sensory deficit present.      Motor: Weakness present.      Gait: Gait abnormal.      Comments: Left hemiparesis   Psychiatric:         Mood and Affect: Mood normal.         Speech: Speech is delayed.         Behavior: Behavior  is slowed. Behavior is cooperative.         Cognition and Memory: Cognition is impaired. Memory is impaired.         Judgment: Judgment is inappropriate.         Pertinent Laboratory/Diagnostic Studies:     The following labs and studies were reviewed please see chart or hospital paperwork for details.    Continue current palliative care management    MOUSTAPHA Senior  5/3/2024 3:35 PM

## 2024-06-07 ENCOUNTER — NURSING HOME VISIT (OUTPATIENT)
Dept: FAMILY MEDICINE CLINIC | Facility: CLINIC | Age: 89
End: 2024-06-07
Payer: COMMERCIAL

## 2024-06-07 DIAGNOSIS — I69.354 HEMIPARESIS AFFECTING LEFT SIDE AS LATE EFFECT OF CEREBROVASCULAR ACCIDENT (HCC): Primary | ICD-10-CM

## 2024-06-07 DIAGNOSIS — R26.9 NEUROLOGIC GAIT DYSFUNCTION: ICD-10-CM

## 2024-06-07 DIAGNOSIS — I10 PRIMARY HYPERTENSION: ICD-10-CM

## 2024-06-07 DIAGNOSIS — F03.90 SENILE DEMENTIA, UNCOMPLICATED (HCC): ICD-10-CM

## 2024-06-07 DIAGNOSIS — M41.86 LEVOSCOLIOSIS OF LUMBAR SPINE: ICD-10-CM

## 2024-06-07 DIAGNOSIS — M41.84 DEXTROSCOLIOSIS OF THORACIC SPINE: ICD-10-CM

## 2024-06-07 PROCEDURE — 99309 SBSQ NF CARE MODERATE MDM 30: CPT | Performed by: NURSE PRACTITIONER

## 2024-06-07 NOTE — PROGRESS NOTES
[facility]  [address] [place] [address]  Facility: [facility]    NAME: Bailey Marrufo  AGE: 89 y.o. SEX: female    DATE OF ENCOUNTER: 6/7/2024    Code status:  Full Code    Assessment and Plan     1. Hemiparesis affecting left side as late effect of cerebrovascular accident (HCC)  2. Senile dementia, uncomplicated (HCC)  3. Dextroscoliosis of thoracic spine  4. Levoscoliosis of lumbar spine  5. Primary hypertension  6. Neurologic gait dysfunction      All medications and routine orders were reviewed and updated as needed.    Plan discussed with: Patient, nursing staff    Chief Complaint     Follow-up of chronic conditions    History of Present Illness     Ms. Marrufo is assessed for follow up.  She is oob to chair, NAD.  She is able to follow commands and make some immediate needs known.  Denies pain, dyspnea, chest pressure.  Appears comfortable.  Appetite fair, stays hydrated.  No weight loss.  Bowel and bladder function at baseline.  Healthy sleep hygiene.  Mood stable, loves to engage in 1:1 conversations.  Afebrile, VSS.      The following portions of the patient's history were reviewed and updated as appropriate: current medications, past family history, past medical history, past social history, past surgical history and problem list.    Allergies:  No Known Allergies    Review of Systems     Review of Systems   Unable to perform ROS: Dementia   Constitutional:  Negative for unexpected weight change.   HENT:  Positive for dental problem, hearing loss and trouble swallowing.    Respiratory:  Negative for shortness of breath.    Cardiovascular:  Negative for leg swelling.   Musculoskeletal:  Positive for gait problem.   Skin:  Positive for pallor.   Neurological:  Positive for speech difficulty and weakness.   Psychiatric/Behavioral:  Positive for confusion.        Medications and orders     All medications reviewed and updated in assisted EMR.      Objective      Vitals: per nursing home records    Physical Exam  Vitals and nursing note reviewed.   Constitutional:       General: She is awake.      Appearance: Normal appearance.      Comments: frail   HENT:      Head: Normocephalic and atraumatic.      Right Ear: Tympanic membrane, ear canal and external ear normal. Decreased hearing noted.      Left Ear: Tympanic membrane, ear canal and external ear normal. Decreased hearing noted.      Nose: Nose normal.      Mouth/Throat:      Mouth: Mucous membranes are moist.      Dentition: Abnormal dentition.      Pharynx: Oropharynx is clear.   Eyes:      Extraocular Movements: Extraocular movements intact.      Conjunctiva/sclera: Conjunctivae normal.      Pupils: Pupils are equal, round, and reactive to light.   Cardiovascular:      Rate and Rhythm: Normal rate and regular rhythm.      Pulses: Normal pulses.      Heart sounds: Murmur heard.   Pulmonary:      Effort: Pulmonary effort is normal.      Breath sounds: Normal breath sounds.   Abdominal:      General: Bowel sounds are normal.      Palpations: Abdomen is soft.   Musculoskeletal:         General: Deformity present. Normal range of motion.      Cervical back: Normal range of motion and neck supple.   Skin:     General: Skin is warm and dry.      Coloration: Skin is pale.   Neurological:      General: No focal deficit present.      Mental Status: She is alert. Mental status is at baseline. She is confused.      GCS: GCS eye subscore is 4. GCS verbal subscore is 4. GCS motor subscore is 6.      Sensory: Sensory deficit present.      Motor: Weakness present.      Gait: Gait abnormal.      Comments: Left hemiparesis   Psychiatric:         Mood and Affect: Mood normal.         Speech: Speech is delayed.         Behavior: Behavior is slowed. Behavior is cooperative.         Cognition and Memory: Cognition is impaired. Memory is impaired.         Judgment: Judgment is inappropriate.         Pertinent Laboratory/Diagnostic  Studies:     The following labs and studies were reviewed please see chart or hospital paperwork for details.    Continue current medical management    MOUSTAPHA Senior  6/7/2024 3:57 PM